# Patient Record
Sex: MALE | Race: BLACK OR AFRICAN AMERICAN | Employment: UNEMPLOYED
[De-identification: names, ages, dates, MRNs, and addresses within clinical notes are randomized per-mention and may not be internally consistent; named-entity substitution may affect disease eponyms.]

---

## 2017-01-24 ENCOUNTER — INITIAL CONSULT (OUTPATIENT)
Dept: PAIN MANAGEMENT | Facility: CLINIC | Age: 42
End: 2017-01-24

## 2017-01-24 VITALS
HEART RATE: 93 BPM | OXYGEN SATURATION: 97 % | RESPIRATION RATE: 16 BRPM | DIASTOLIC BLOOD PRESSURE: 77 MMHG | BODY MASS INDEX: 28.79 KG/M2 | HEIGHT: 68 IN | SYSTOLIC BLOOD PRESSURE: 117 MMHG | WEIGHT: 190 LBS

## 2017-01-24 DIAGNOSIS — F19.11 H/O: SUBSTANCE ABUSE (HCC): ICD-10-CM

## 2017-01-24 DIAGNOSIS — F99 PSYCHIATRIC ILLNESS: ICD-10-CM

## 2017-01-24 DIAGNOSIS — M25.562 CHRONIC PAIN OF LEFT KNEE: Primary | ICD-10-CM

## 2017-01-24 DIAGNOSIS — G89.29 CHRONIC PAIN OF LEFT KNEE: Primary | ICD-10-CM

## 2017-01-24 PROCEDURE — 99244 OFF/OP CNSLTJ NEW/EST MOD 40: CPT | Performed by: ANESTHESIOLOGY

## 2017-01-24 RX ORDER — CETIRIZINE HYDROCHLORIDE 10 MG/1
TABLET ORAL
Refills: 0 | COMMUNITY
Start: 2016-11-07 | End: 2017-01-24 | Stop reason: ALTCHOICE

## 2017-01-24 RX ORDER — ZOLPIDEM TARTRATE 12.5 MG/1
12.5 TABLET, FILM COATED, EXTENDED RELEASE ORAL
Status: ON HOLD | COMMUNITY
End: 2021-11-19 | Stop reason: HOSPADM

## 2017-01-24 RX ORDER — NICOTINE 21 MG/24HR
PATCH, TRANSDERMAL 24 HOURS TRANSDERMAL
COMMUNITY
Start: 2016-10-06 | End: 2017-01-24 | Stop reason: ALTCHOICE

## 2017-01-24 RX ORDER — ASENAPINE 10 MG/1
10 TABLET SUBLINGUAL
Status: ON HOLD | COMMUNITY
End: 2021-11-19 | Stop reason: HOSPADM

## 2017-01-24 RX ORDER — SULFAMETHOXAZOLE AND TRIMETHOPRIM 800; 160 MG/1; MG/1
TABLET ORAL
COMMUNITY
Start: 2016-12-22 | End: 2017-01-24 | Stop reason: ALTCHOICE

## 2017-01-24 RX ORDER — PRAZOSIN HYDROCHLORIDE 1 MG/1
CAPSULE ORAL
Status: ON HOLD | COMMUNITY
Start: 2016-08-15 | End: 2021-11-19 | Stop reason: HOSPADM

## 2017-01-24 RX ORDER — BENZTROPINE MESYLATE 1 MG/1
1 TABLET ORAL
COMMUNITY
End: 2017-01-24 | Stop reason: ALTCHOICE

## 2017-01-24 RX ORDER — OXYCODONE AND ACETAMINOPHEN 7.5; 325 MG/1; MG/1
TABLET ORAL
Status: ON HOLD | COMMUNITY
Start: 2016-10-06 | End: 2021-11-19 | Stop reason: HOSPADM

## 2017-01-24 RX ORDER — CEPHALEXIN 500 MG/1
CAPSULE ORAL
Refills: 0 | COMMUNITY
Start: 2016-11-07 | End: 2017-01-24 | Stop reason: ALTCHOICE

## 2017-01-24 RX ORDER — BENZTROPINE MESYLATE 1 MG/1
1 TABLET ORAL DAILY
Status: ON HOLD | COMMUNITY
End: 2021-11-19 | Stop reason: HOSPADM

## 2017-01-24 RX ORDER — PROMETHAZINE HYDROCHLORIDE AND CODEINE PHOSPHATE 6.25; 1 MG/5ML; MG/5ML
SYRUP ORAL
COMMUNITY
Start: 2016-12-22 | End: 2017-01-24 | Stop reason: ALTCHOICE

## 2017-01-24 RX ORDER — NAPROXEN 500 MG/1
TABLET ORAL
COMMUNITY
Start: 2016-11-23 | End: 2017-01-24 | Stop reason: ALTCHOICE

## 2017-01-24 ASSESSMENT — ENCOUNTER SYMPTOMS
ALLERGIC/IMMUNOLOGIC NEGATIVE: 1
GASTROINTESTINAL NEGATIVE: 1
BACK PAIN: 1
RESPIRATORY NEGATIVE: 1
EYES NEGATIVE: 1

## 2021-11-13 ENCOUNTER — HOSPITAL ENCOUNTER (INPATIENT)
Age: 46
LOS: 7 days | Discharge: HOME | DRG: 751 | End: 2021-11-20
Attending: PSYCHIATRY & NEUROLOGY | Admitting: PSYCHIATRY & NEUROLOGY
Payer: MEDICARE

## 2021-11-13 PROBLEM — F32.A DEPRESSION WITH SUICIDAL IDEATION: Status: ACTIVE | Noted: 2021-11-13

## 2021-11-13 PROBLEM — R45.851 DEPRESSION WITH SUICIDAL IDEATION: Status: ACTIVE | Noted: 2021-11-13

## 2021-11-13 PROCEDURE — 1240000000 HC EMOTIONAL WELLNESS R&B

## 2021-11-13 PROCEDURE — 6370000000 HC RX 637 (ALT 250 FOR IP): Performed by: PSYCHIATRY & NEUROLOGY

## 2021-11-13 RX ORDER — HYDROXYZINE 50 MG/1
50 TABLET, FILM COATED ORAL 3 TIMES DAILY PRN
Status: DISCONTINUED | OUTPATIENT
Start: 2021-11-13 | End: 2021-11-20 | Stop reason: HOSPADM

## 2021-11-13 RX ORDER — NICOTINE 21 MG/24HR
1 PATCH, TRANSDERMAL 24 HOURS TRANSDERMAL DAILY
Status: DISCONTINUED | OUTPATIENT
Start: 2021-11-13 | End: 2021-11-20 | Stop reason: HOSPADM

## 2021-11-13 RX ORDER — TRAZODONE HYDROCHLORIDE 50 MG/1
50 TABLET ORAL NIGHTLY PRN
Status: DISCONTINUED | OUTPATIENT
Start: 2021-11-13 | End: 2021-11-20 | Stop reason: HOSPADM

## 2021-11-13 RX ORDER — ACETAMINOPHEN 325 MG/1
650 TABLET ORAL EVERY 4 HOURS PRN
Status: DISCONTINUED | OUTPATIENT
Start: 2021-11-13 | End: 2021-11-20 | Stop reason: HOSPADM

## 2021-11-13 RX ORDER — POLYETHYLENE GLYCOL 3350 17 G/17G
17 POWDER, FOR SOLUTION ORAL DAILY PRN
Status: DISCONTINUED | OUTPATIENT
Start: 2021-11-13 | End: 2021-11-20 | Stop reason: HOSPADM

## 2021-11-13 RX ORDER — MAGNESIUM HYDROXIDE/ALUMINUM HYDROXICE/SIMETHICONE 120; 1200; 1200 MG/30ML; MG/30ML; MG/30ML
30 SUSPENSION ORAL EVERY 6 HOURS PRN
Status: DISCONTINUED | OUTPATIENT
Start: 2021-11-13 | End: 2021-11-20 | Stop reason: HOSPADM

## 2021-11-13 RX ORDER — ZOLPIDEM TARTRATE 10 MG/1
10 TABLET ORAL NIGHTLY PRN
Status: DISCONTINUED | OUTPATIENT
Start: 2021-11-13 | End: 2021-11-14

## 2021-11-13 RX ORDER — IBUPROFEN 400 MG/1
400 TABLET ORAL EVERY 6 HOURS PRN
Status: DISCONTINUED | OUTPATIENT
Start: 2021-11-13 | End: 2021-11-20 | Stop reason: HOSPADM

## 2021-11-13 RX ADMIN — ACETAMINOPHEN 650 MG: 325 TABLET ORAL at 19:13

## 2021-11-13 RX ADMIN — HYDROXYZINE HYDROCHLORIDE 50 MG: 50 TABLET, FILM COATED ORAL at 23:27

## 2021-11-13 RX ADMIN — IBUPROFEN 400 MG: 400 TABLET ORAL at 19:58

## 2021-11-13 RX ADMIN — ACETAMINOPHEN 650 MG: 325 TABLET ORAL at 23:27

## 2021-11-13 RX ADMIN — TRAZODONE HYDROCHLORIDE 50 MG: 50 TABLET ORAL at 21:00

## 2021-11-13 RX ADMIN — ZOLPIDEM TARTRATE 10 MG: 10 TABLET ORAL at 22:56

## 2021-11-13 ASSESSMENT — PAIN SCALES - GENERAL
PAINLEVEL_OUTOF10: 10
PAINLEVEL_OUTOF10: 10
PAINLEVEL_OUTOF10: 5
PAINLEVEL_OUTOF10: 10

## 2021-11-13 ASSESSMENT — PAIN DESCRIPTION - ORIENTATION: ORIENTATION: RIGHT;LEFT

## 2021-11-13 ASSESSMENT — SLEEP AND FATIGUE QUESTIONNAIRES
SLEEP PATTERN: DIFFICULTY FALLING ASLEEP;DISTURBED/INTERRUPTED SLEEP;INSOMNIA
DIFFICULTY FALLING ASLEEP: YES
RESTFUL SLEEP: NO
DIFFICULTY ARISING: NO
AVERAGE NUMBER OF SLEEP HOURS: 2
DO YOU HAVE DIFFICULTY SLEEPING: YES
DO YOU USE A SLEEP AID: YES
DIFFICULTY STAYING ASLEEP: YES

## 2021-11-13 ASSESSMENT — PAIN DESCRIPTION - LOCATION: LOCATION: RIB CAGE

## 2021-11-13 ASSESSMENT — PAIN DESCRIPTION - FREQUENCY: FREQUENCY: CONTINUOUS

## 2021-11-13 ASSESSMENT — PAIN DESCRIPTION - PAIN TYPE: TYPE: ACUTE PAIN

## 2021-11-13 ASSESSMENT — PATIENT HEALTH QUESTIONNAIRE - PHQ9: SUM OF ALL RESPONSES TO PHQ QUESTIONS 1-9: 18

## 2021-11-13 ASSESSMENT — LIFESTYLE VARIABLES: HISTORY_ALCOHOL_USE: NO

## 2021-11-13 ASSESSMENT — PAIN DESCRIPTION - DESCRIPTORS: DESCRIPTORS: ACHING;CONSTANT
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
## 2021-11-13 NOTE — BH NOTE
Patient scanned with security wand per order for safety. Patient signed voluntary consent for treatment form only, did not sign any other documents at this time.

## 2021-11-14 PROBLEM — F12.10 CANNABIS USE DISORDER, MILD, ABUSE: Status: ACTIVE | Noted: 2021-11-14

## 2021-11-14 PROBLEM — F32.3 MAJOR DEPRESSIVE DISORDER, SINGLE EPISODE, SEVERE WITH PSYCHOTIC FEATURES (HCC): Status: ACTIVE | Noted: 2021-11-14

## 2021-11-14 PROBLEM — F23 ACUTE PSYCHOSIS (HCC): Status: ACTIVE | Noted: 2021-11-14

## 2021-11-14 PROBLEM — F33.3 MAJOR DEPRESSIVE DISORDER, RECURRENT, SEVERE WITH PSYCHOTIC FEATURES (HCC): Status: ACTIVE | Noted: 2021-11-14

## 2021-11-14 PROCEDURE — 1240000000 HC EMOTIONAL WELLNESS R&B

## 2021-11-14 PROCEDURE — APPSS180 APP SPLIT SHARED TIME > 60 MINUTES: Performed by: NURSE PRACTITIONER

## 2021-11-14 PROCEDURE — 6370000000 HC RX 637 (ALT 250 FOR IP): Performed by: PSYCHIATRY & NEUROLOGY

## 2021-11-14 PROCEDURE — 90792 PSYCH DIAG EVAL W/MED SRVCS: CPT | Performed by: PSYCHIATRY & NEUROLOGY

## 2021-11-14 PROCEDURE — 99223 1ST HOSP IP/OBS HIGH 75: CPT | Performed by: INTERNAL MEDICINE

## 2021-11-14 RX ORDER — QUETIAPINE FUMARATE 50 MG/1
50 TABLET, FILM COATED ORAL 2 TIMES DAILY
Status: DISCONTINUED | OUTPATIENT
Start: 2021-11-14 | End: 2021-11-15

## 2021-11-14 RX ADMIN — QUETIAPINE FUMARATE 50 MG: 50 TABLET ORAL at 10:57

## 2021-11-14 RX ADMIN — TRAZODONE HYDROCHLORIDE 50 MG: 50 TABLET ORAL at 20:36

## 2021-11-14 RX ADMIN — QUETIAPINE FUMARATE 50 MG: 50 TABLET ORAL at 20:36

## 2021-11-14 RX ADMIN — HYDROXYZINE HYDROCHLORIDE 50 MG: 50 TABLET, FILM COATED ORAL at 19:49

## 2021-11-14 RX ADMIN — HYDROXYZINE HYDROCHLORIDE 50 MG: 50 TABLET, FILM COATED ORAL at 14:15

## 2021-11-14 RX ADMIN — ACETAMINOPHEN 650 MG: 325 TABLET ORAL at 19:48

## 2021-11-14 ASSESSMENT — PAIN SCALES - GENERAL
PAINLEVEL_OUTOF10: 10
PAINLEVEL_OUTOF10: 8
PAINLEVEL_OUTOF10: 8

## 2021-11-14 ASSESSMENT — PAIN DESCRIPTION - PAIN TYPE
TYPE: CHRONIC PAIN

## 2021-11-14 ASSESSMENT — PAIN DESCRIPTION - ORIENTATION
ORIENTATION: LEFT

## 2021-11-14 ASSESSMENT — PAIN DESCRIPTION - DESCRIPTORS
DESCRIPTORS: THROBBING
DESCRIPTORS: NAGGING
DESCRIPTORS: NAGGING

## 2021-11-14 ASSESSMENT — PAIN DESCRIPTION - LOCATION
LOCATION: OTHER (COMMENT)
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
## 2021-11-14 NOTE — GROUP NOTE
Group Therapy Note    Date: 11/13/2021    Group Start Time: 2000  Group End Time: 2017  Group Topic: Wrap-Up    LEANDER Mitchell        Group Therapy Note    Attendees: 7/19           Status After Intervention:  Improved    Participation Level:  Active Listener    Participation Quality: Appropriate      Speech:  normal      Thought Process/Content: Logical      Affective Functioning: Congruent      Mood: elevated      Level of consciousness:  Alert      Response to Learning: Able to verbalize current knowledge/experience      Endings: None Reported    Modes of Intervention: Support and Socialization      Discipline Responsible: [employer]      Signature:  Hannah Lockhart

## 2021-11-14 NOTE — CARE COORDINATION
BHI Biopsychosocial Assessment    Current Level of Psychosocial Functioning     Independent xx  Dependent    Minimal Assist     Comments:    Psychosocial High Risk Factors (check all that apply)    Unable to obtain meds   Chronic illness/pain  xx  Substance abuse   Lack of Family Support   Financial stress xx  Isolation   Inadequate Community Resources  Suicide attempt(s)  Not taking medications  xx  Victim of crime   Developmental Delay  Unable to manage personal needs    Age 72 or older   Homeless  No transportation   Readmission within 30 days  Unemployment  Traumatic Event    Comments:   Psychiatric Advanced Directives: pt denies     Family to Involve in Treatment:     Sexual Orientation:  N/A    Patient Strengths: pt is linked with [facility], pt receives [employer]     Patient Barriers: pt has history of cocaine abuse reports he has been told he had a positive drug screen but disputes this as inaccurate, pt is unemployed      Opiate Education Provided:  Pt denies opiate abuse       CMHC/mental health history: Pt reports he is linked with [facility]. Plan of Care   medication management, group/individual therapies, family meetings, psycho -education, treatment team meetings to assist with stabilization    Initial Discharge Plan:  Pt to return home at discharge. Clinical Summary:  Chacha Cintron is a 51year old single male who has been admitted to [facility], pt reports he has had increase in \"confusion, and I had devices in the house going haywire,\" pt reports he believes his cell phone \"would do it's own thing,\" reports the televisions in his home were \"all messed up, going to a foreign language then back again. \" Pt reports his GF brought him to hospital to address his \"confusion\" and disorganized thought process.  Pt reports he collects social security income disability, states he \"occasionally helps out\" at the [facility], but states he believes that social security \"knows what I'm doing and they are ready to cut me off. \" Pt states he believes this because he has \"all kinds of social security numbers and birth dates. \" Pt states his GF is supportive. Pt denies AOD issues, states he discussed this with another provider today when the provider told him he had a positive drug screen, denies use of cocaine, denies need for resources. Pt denies any legal concerns, pt states he has some traffic fines to regain his driving privileges. Pt reports he has 5 children, pt states he has relationships with the children but hasn't seen his oldest son \"in a quite a while. \" SW offered ongoing support, encouragement.
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
## 2021-11-14 NOTE — GROUP NOTE
Group Therapy Note    Date: 11/14/2021    Group Start Time: 1000  Group End Time: 4017  Group Topic: Psychoeducation    [address], LSW    patient refused to attend psychoeducation group at 10a after encouragement from staff.   1:1 talk time provided as alternative to group session

## 2021-11-14 NOTE — BH NOTE
Patient approached the nursing station regarding his wallet he insisted that he see it and that it be put in the safe. He then went in his pocket and pulled out a orange lighter and stated \" They didn't check me so I still have this\" I instantly directed him to follow me to the triage room where I asked \"Do you have anything else on you \" He said \" No\" I than used the metal detector and wanded the surface of his entire body. Nothing more was found.

## 2021-11-14 NOTE — PLAN OF CARE
Problem: Altered Mood, Depressive Behavior:  Goal: Able to verbalize and/or display a decrease in depressive symptoms  Description: Able to verbalize and/or display a decrease in depressive symptoms  Outcome: Ongoing     Problem: Altered Mood, Depressive Behavior:  Goal: Ability to disclose and discuss suicidal ideas will improve  Description: Ability to disclose and discuss suicidal ideas will improve  11/14/2021 1507 by Faustina Hu  Outcome: Ongoing     Problem: Altered Mood, Depressive Behavior:  Goal: Absence of self-harm  Description: Absence of self-harm  Outcome: Ongoing
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
## 2021-11-14 NOTE — PROGRESS NOTES
Behavioral Services  Medicare Certification Upon Admission    I certify that this patient's inpatient psychiatric hospital admission is medically necessary for:    [x] (1) Treatment which could reasonably be expected to improve this patient's condition,       [x] (2) Or for diagnostic study;     AND     [x](2) The inpatient psychiatric services are provided while the individual is under the care of a physician and are included in the individualized plan of care.     Estimated length of stay/service 3-5 days    Plan for post-hospital care [facility]    Electronically signed by Edmond Bryan MD on 11/14/2021 at 9:52 AM
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
## 2021-11-14 NOTE — PLAN OF CARE
Problem: Altered Mood, Depressive Behavior:  Goal: Ability to disclose and discuss suicidal ideas will improve  Description: Ability to disclose and discuss suicidal ideas will improve  Outcome: Ongoing     Patient denied any thoughts or plans to commit suicide. He is being monitors per Q 15 Min. Problem: Pain:  Goal: Pain level will decrease  Description: Pain level will decrease  Outcome: Ongoing   Patient reported 10/10 on the pain scale. PRN medications were adminidtereassessed and reported pain  8/10.

## 2021-11-15 PROCEDURE — 6370000000 HC RX 637 (ALT 250 FOR IP): Performed by: PSYCHIATRY & NEUROLOGY

## 2021-11-15 PROCEDURE — APPSS30 APP SPLIT SHARED TIME 16-30 MINUTES: Performed by: NURSE PRACTITIONER

## 2021-11-15 PROCEDURE — 90833 PSYTX W PT W E/M 30 MIN: CPT | Performed by: PSYCHIATRY & NEUROLOGY

## 2021-11-15 PROCEDURE — 1240000000 HC EMOTIONAL WELLNESS R&B

## 2021-11-15 PROCEDURE — 99232 SBSQ HOSP IP/OBS MODERATE 35: CPT | Performed by: PSYCHIATRY & NEUROLOGY

## 2021-11-15 RX ORDER — HALOPERIDOL 5 MG
5 TABLET ORAL 2 TIMES DAILY
Status: DISCONTINUED | OUTPATIENT
Start: 2021-11-15 | End: 2021-11-15

## 2021-11-15 RX ORDER — HALOPERIDOL 1 MG/1
1 TABLET ORAL 3 TIMES DAILY
Status: DISCONTINUED | OUTPATIENT
Start: 2021-11-15 | End: 2021-11-17

## 2021-11-15 RX ORDER — HALOPERIDOL 1 MG/1
0.5 TABLET ORAL ONCE
Status: COMPLETED | OUTPATIENT
Start: 2021-11-15 | End: 2021-11-15

## 2021-11-15 RX ADMIN — HALOPERIDOL 0.5 MG: 1 TABLET ORAL at 14:42

## 2021-11-15 RX ADMIN — QUETIAPINE FUMARATE 50 MG: 50 TABLET ORAL at 07:55

## 2021-11-15 RX ADMIN — HALOPERIDOL 1 MG: 1 TABLET ORAL at 20:28

## 2021-11-15 RX ADMIN — HYDROXYZINE HYDROCHLORIDE 50 MG: 50 TABLET, FILM COATED ORAL at 07:56

## 2021-11-15 RX ADMIN — ACETAMINOPHEN 650 MG: 325 TABLET ORAL at 20:28

## 2021-11-15 RX ADMIN — TRAZODONE HYDROCHLORIDE 50 MG: 50 TABLET ORAL at 22:54

## 2021-11-15 RX ADMIN — IBUPROFEN 400 MG: 400 TABLET ORAL at 18:35

## 2021-11-15 RX ADMIN — IBUPROFEN 400 MG: 400 TABLET ORAL at 12:44

## 2021-11-15 ASSESSMENT — PAIN SCALES - GENERAL
PAINLEVEL_OUTOF10: 4
PAINLEVEL_OUTOF10: 3
PAINLEVEL_OUTOF10: 2
PAINLEVEL_OUTOF10: 7

## 2021-11-15 NOTE — GROUP NOTE
Group Therapy Note    Date: 11/15/2021    Group Start Time: 1000  Group End Time: 6508  Group Topic: Psychoeducation    SHANTI Perera LSW        Group Therapy Note    patient refused to attend Psychoeducational group at 10:00 AM after encouragement from staff.               Signature:  SHANTI Crouch LSW

## 2021-11-15 NOTE — PROGRESS NOTES
Daily Progress Note  11/15/2021    Patient Name: Wendy Corbin:   Suicidal ideation with a plan to overdose, paranoia, delusional thoughts         PRNs: No emergency medications administered since admission     Scheduled medications: Adherent     SW discharge plan:  Lives in a home with three children and a long-term girlfriend    SUBJECTIVE:     Patient seen for follow-up assessment. He is anxious and paranoid. Demanding to be discharged. States that his 72-hour hold is over. Wants to see his \"pink slip\". Patient endorsing belief that all of his paperwork is really for someone named Paulina Castillo and states that staff are giving him the medication for Paulina Castillo. Explained how our process works to ensure that medications are only given to the appropriate patient and Mr. Melva Moore becomes more anxious. He states that he hates computers and is focused on viruses and Malware. Patient is restless, fidgeting, and leg is bouncing. Patient is concerned that he is not receiving the appropriate medications. Did request for patient to sign FELISA to be able to speak with his provider at [facility] to gain collateral information, but patient denied. Patient is guarded throughout our discussion. Staff report that patient had been endorsing auditory hallucinations which were telling him he was not safe and seeing visual hallucinations of  shadows. Reviewed past medical records more thoroughly. Per documentation from 2009, patient has a past diagnosis of schizoaffective disorder bipolar type. He has trialed many medications including Geodon, Zyprexa, risperidone, and Tegretol, all with reported \"tongue swelling\" side effects. Unable to find any past history of utilizing Haldol or any side effects to that medication. Will discontinue Seroquel and start Haldol with plan to transition to long-acting injectable with demonstrated tolerability.     Patient does express concern that he will miss an appointment that was scheduled many months ago with comprehensive pain clinic. He does not want to miss this appointment on 11/19. Appetite:  [] Normal/Adequate/Unchanged  [] Increased  [x] Decreased      Sleep:       [] Normal/Adequate/Unchanged  [] Fair  [x] Poor      Group Attendance on Unit:   [] Yes  [x] Selectively    [] No    Medication Side Effects: Dry mouth  Patient denies drinking. States it makes him have to go to the bathroom. Encouraged patient to consume more fluids. Mental Status Exam  Level of consciousness: Alert and awake   Appearance: Appropriate attire for setting, seated in chair, with poor grooming and hygiene   Behavior/Motor: Restless, psychomotor agitation  Attitude toward examiner: Paranoid, guarded, intense eye contact, suspicious  Speech: Tremulous at times, breathy, rapid, well articulated  Mood: \"Not good\"  Affect: Anxious, reactive  Thought processes: linear and thought blocking   Thought content: Denies homicidal ideation  Suicidal Ideation: Provided no response  Delusions: Paranoid and persecutory  Perceptual Disturbance: Endorsing both auditory and visual  Cognition: Oriented to self,, location, and time, and not situation memory: intact  Insight & Judgement: poor     Data   height is 5' 8\" (1.727 m) and weight is 198 lb (89.8 kg). His temporal temperature is 96.9 °F (36.1 °C). His blood pressure is 130/90 (abnormal) and his pulse is 92. His respiration is 14. Labs:   No visits with results within 2 Day(s) from this visit.    Latest known visit with results is:   Admission on 04/22/2016, Discharged on 04/22/2016   Component Date Value Ref Range Status    Acetaminophen Level 04/22/2016 <10* 10 - 30 ug/mL Final    Comment: Performed at [facility] Emergency Dept and [facility], [address] [place] [address], [address] [place] [address]      Glucose 04/22/2016 95  70 - 99 mg/dL Final    BUN 04/22/2016 13  6 - 20 mg/dL Final    CREATININE 04/22/2016 1.10  0.70 - 1.20 mg/dL Final    Bun/Cre Ratio 04/22/2016 NOT REPORTED  9 - 20 Final    Calcium 04/22/2016 9.4  8.6 - 10.4 mg/dL Final    Sodium 04/22/2016 139  135 - 144 mmol/L Final    Potassium 04/22/2016 3.8  3.7 - 5.3 mmol/L Final    Chloride 04/22/2016 100  98 - 107 mmol/L Final    CO2 04/22/2016 24  20 - 31 mmol/L Final    Anion Gap 04/22/2016 15  9 - 17 mmol/L Final    GFR Non- 04/22/2016 >60  >60 mL/min Final    GFR  04/22/2016 >60  >60 mL/min Final    GFR Comment 04/22/2016        Final    Comment: Average GFR for 3649 years old:   80 mL/min/1.73sq m  Chronic Kidney Disease:   <60 mL/min/1.73sq m  Kidney failure:   <15 mL/min/1.73sq m        Performed at [facility] Emergency Dept and [address] [facility], [facility], [address] [place] [address]      GFR Staging 04/22/2016 NOT REPORTED   Final    WBC 04/22/2016 12.9* 3.5 - 11.0 k/uL Final    RBC 04/22/2016 5.23  4.5 - 5.9 m/uL Final    Hemoglobin 04/22/2016 14.0  13.5 - 17.5 g/dL Final    Hematocrit 04/22/2016 44.1  41 - 53 % Final    MCV 04/22/2016 84.3  80 - 100 fL Final    MCH 04/22/2016 26.7  26 - 34 pg Final    MCHC 04/22/2016 31.7  31 - 37 g/dL Final    RDW 04/22/2016 14.1  12.5 - 15.4 % Final    Platelets 71/14/3127 182  140 - 450 k/uL Final    MPV 04/22/2016 9.9  6.0 - 12.0 fL Final    Differential Type 04/22/2016 NOT REPORTED   Final    WBC Morphology 04/22/2016 NOT REPORTED   Final    RBC Morphology 04/22/2016 NOT REPORTED   Final    Platelet Estimate 66/50/0626 NOT REPORTED   Final    Seg Neutrophils 04/22/2016 71* 36 - 66 % Final    Lymphocytes 04/22/2016 20* 24 - 44 % Final    Monocytes 04/22/2016 8  2 - 11 % Final    Eosinophils % 04/22/2016 0* 1 - 4 % Final    Basophils 04/22/2016 1  0 - 2 % Final    Segs Absolute 04/22/2016 9.20* 1.8 - 7.7 k/uL Final    Absolute Lymph # 04/22/2016 2.60  1.0 - 4.8 k/uL Final    Absolute Mono # 04/22/2016 1.10  0.1 - 1.2 k/uL Final    Absolute Eos # 04/22/2016 0.00  0.0 - 0.4 k/uL Final    Basophils Absolute 04/22/2016 0.10  0.0 - 0.2 k/uL Final    Comment: Performed at [facility] Emergency Dept and [address] [place] [address], [address] [place] [address]      Ethanol 04/22/2016 <10  <10 mg/dL Final    Ethanol percent 04/22/2016 <0.010  % Final    Comment: Performed at [facility] Emergency Dept and [address] [place] [address], [address] [place] [address]      Albumin 04/22/2016 4.5  3.5 - 5.2 g/dL Final    Alkaline Phosphatase 04/22/2016 53  40 - 129 U/L Final    ALT 04/22/2016 28  5 - 41 U/L Final    AST 04/22/2016 40* <40 U/L Final    Total Bilirubin 04/22/2016 0.60  0.3 - 1.2 mg/dL Final    Bilirubin, Direct 04/22/2016 0.15  <0.31 mg/dL Final    Bilirubin, Indirect 04/22/2016 0.45  0.00 - 1.00 mg/dL Final    Total Protein 04/22/2016 7.8  6.4 - 8.3 g/dL Final    Globulin 04/22/2016 NOT REPORTED  1.5 - 3.8 g/dL Final    Albumin/Globulin Ratio 04/22/2016 1.4  1.0 - 2.5 Final    Comment: Performed at [facility] Emergency Dept and [address] [place] [address], [address] [place] [address]      Amphetamine Screen, Ur 04/22/2016 NEGATIVE  NEG Final    Comment:       (Positive cutoff 1000 ng/mL)                  Barbiturate Screen, Ur 04/22/2016 NEGATIVE  NEG Final    Comment:       (Positive cutoff 200 ng/mL)                  Benzodiazepine Screen, Urine 04/22/2016 POSITIVE* NEG Final    Comment:       (Positive cutoff 200 ng/mL)                  Cocaine Metabolite, Urine 04/22/2016 POSITIVE* NEG Final    Comment:       (Positive cutoff 300 ng/mL)                  Methadone Screen, Urine 04/22/2016 NEGATIVE  NEG Final    Comment:       (Positive cutoff 300 ng/mL)                  Opiates, Urine 04/22/2016 NEGATIVE  NEG Final    Comment:       (Positive cutoff 300 ng/mL)                  Phencyclidine, Urine 04/22/2016 NEGATIVE  NEG Final    Comment:       (Positive cutoff 25 ng/mL)                  Propoxyphene, Urine 04/22/2016 NOT REPORTED  NEG Final    Cannabinoid Scrn, Ur 04/22/2016 POSITIVE* NEG Final    Comment:       (Positive cutoff 50 ng/mL)                  Oxycodone Screen, Ur 04/22/2016 NEGATIVE  NEG Final    Comment:       (Positive cutoff 100 ng/mL)                  Methamphetamine, Urine 04/22/2016 NOT REPORTED  NEG Final    Tricyclic Antidepressants, Urine 04/22/2016 NOT REPORTED  NEG Final    MDMA, Urine 04/22/2016 NOT REPORTED  NEG Final    Buprenorphine Urine 04/22/2016 NOT REPORTED  NEG Final    Test Information 04/22/2016 Assay provides medical screening only. The absence of expected drug(s) and/or   Final    Comment:  metabolite(s) may indicate diluted or adulterated urine, limitations of testing   or timing of collection. Testing for legal purposes should be confirmed by another method. To request   confirmation of test result, please call the lab within 7 days of sample   submission. Performed at [facility] Emergency Dept and [address] [place] [address]      Salicylate Lvl 89/10/4910 <1* 3 - 10 mg/dL Final    Comment: Performed at [facility] Emergency Dept and [address] [place] [address]      Ventricular Rate 04/22/2016 87  BPM Final    Atrial Rate 04/22/2016 87  BPM Final    P-R Interval 04/22/2016 124  ms Final    QRS Duration 04/22/2016 88  ms Final    Q-T Interval 04/22/2016 370  ms Final    QTc Calculation (Bazett) 04/22/2016 445  ms Final    P Axis 04/22/2016 52  degrees Final    R Axis 04/22/2016 22  degrees Final    T Axis 04/22/2016 25  degrees Final         Reviewed patient's current plan of care and vital signs with nursing staff.     Labs reviewed: [x] Yes  Last EKG in EMR reviewed: [x] Yes    Medications  Current Facility-Administered Medications: haloperidol (HALDOL) tablet 5 mg, 5 mg, Oral, BID  acetaminophen (TYLENOL) tablet 650 mg, 650 mg, Oral, Q4H PRN  aluminum & magnesium hydroxide-simethicone (MAALOX) 200-200-20 MG/5ML suspension 30 mL, 30 mL, Oral, Q6H PRN  hydrOXYzine (ATARAX) tablet 50 mg, 50 mg, Oral, TID PRN  ibuprofen (ADVIL;MOTRIN) tablet 400 mg, 400 mg, Oral, Q6H PRN  polyethylene glycol (GLYCOLAX) packet 17 g, 17 g, Oral, Daily PRN  traZODone (DESYREL) tablet 50 mg, 50 mg, Oral, Nightly PRN  nicotine (NICODERM CQ) 14 MG/24HR 1 patch, 1 patch, TransDERmal, Daily  influenza quadrivalent split vaccine (FLUZONE;FLUARIX;FLULAVAL;AFLURIA) injection 0.5 mL, 0.5 mL, IntraMUSCular, Prior to discharge    ASSESSMENT  Major depressive disorder, recurrent, severe with psychotic features ([facility] [address]    Rule out schizoaffective disorder         PLAN  Patient symptoms are: Remains Unstable  Discontinue Seroquel  Start Haldol 5 mg twice daily with plan to transition to long-acting injectable once tolerability established  Monitor need and frequency of PRN medications  Encourage participation in groups and milieu  Attempt to develop insight  Psycho-education conducted  Supportive Therapy conducted  Probable discharge: Per attending MD  Follow-up daily while inpatient    Patient continues to be monitored in the inpatient psychiatric facility at [facility] for safety and stabilization. Patient continues to need, on a daily basis, active treatment furnished directly by or requiring the supervision of inpatient psychiatric personnel. Electronically signed by MAGY Baum CNP on 11/15/2021 at 2:04 PM    **This report has been created using voice recognition software. It may contain minor errors which are inherent in voice recognition technology. **    I independently saw and evaluated the patient. I reviewed the nurse practitioners documentation above. Any additional comments or changes to the nurse practitioners documentation are stated below otherwise agree with assessment. Plan will be as follows:  Spent 30 minutes with the patient, of that greater than 16 minutes spent in supportive psychotherapy.   Patient actively hearing auditory hallucinations telling

## 2021-11-15 NOTE — GROUP NOTE
Group Therapy Note    Date: 11/15/2021    Group Start Time: 1100  Group End Time: 3578  Group Topic: Psychoeducation    LEANDER Richardson      Patient declined to attend self-expression group at 1100 despite encouragement from staff. 1:1 talk time offered by staff as alternative to group session.       Signature:  Daisy Shore

## 2021-11-15 NOTE — PLAN OF CARE
Problem: Altered Mood, Depressive Behavior:  Goal: Able to verbalize and/or display a decrease in depressive symptoms  Description: Able to verbalize and/or display a decrease in depressive symptoms  11/14/2021 2249 by Shy Yip LPN  Outcome: Ongoing     Patient showed a slight decrease in his depressive state after being redirected, educated on his medication, given his prn medications and attending group therapy. Prior to he complained of being depressed 10/10 on a scale of 10/10 after therapeutic medication regimen was he administered he reported a 8/10 on a 0/10 scale. Problem: Altered Mood, Depressive Behavior:  Goal: Absence of self-harm  Description: Absence of self-harm  11/14/2021 2249 by Shy Yip LPN  Outcome: Ongoing    Although patient has reported hearing voice telling him \" he is not safe\" and seeing shadows. He continues to remain absent of self harm and is currently showing no sign nor symptoms of harming himself. He is being monitored every 15 min for any signs and symptoms of change.

## 2021-11-15 NOTE — GROUP NOTE
Group Therapy Note    Date: 11/15/2021    Group Start Time: 1330  Group End Time: 1400  Group Topic: Psychoeducation    LEANDER PINEDA    Autumn Richardson        Group Therapy Note    Attendees: 12/22         Patient's Goal:  To improve patient knowledge of healthy coping skills     Notes:  Patient was pleasant and appropriate throughout the session     Status After Intervention:  Improved    Participation Level:  Active Listener and Interactive    Participation Quality: Appropriate, Attentive, Sharing and Supportive      Speech:  normal      Thought Process/Content: Logical      Affective Functioning: Congruent      Mood: euthymic      Level of consciousness:  Alert, Oriented x4 and Attentive      Response to Learning: Able to verbalize current knowledge/experience, Able to verbalize/acknowledge new learning, Capable of insight and Progressing to goal      Endings: None Reported    Modes of Intervention: Education, Support, Socialization, Exploration, Clarifying and Problem-solving      Discipline Responsible: Psychoeducational Specialist      Signature:  Sincere Hernandez

## 2021-11-15 NOTE — GROUP NOTE
Group Therapy Note    Date: 11/14/2021    Group Start Time: 2005  Group End Time: 2024  Group Topic: Wrap-Up    LEANDER Mitchell        Group Therapy Note    Attendees: 11/23             Status After Intervention:  Improved    Participation Level:  Active Listener    Participation Quality: Appropriate      Speech:  normal      Thought Process/Content: Logical      Affective Functioning: Congruent      Mood: elevated      Level of consciousness:  Alert      Response to Learning: Able to verbalize current knowledge/experience      Endings: None Reported    Modes of Intervention: Support and Socialization      Discipline Responsible: Behavorial Health Tech      Signature:  Hannah Lockhart
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
## 2021-11-15 NOTE — PLAN OF CARE
Problem: Altered Mood, Depressive Behavior:  Goal: Able to verbalize and/or display a decrease in depressive symptoms  Description: Able to verbalize and/or display a decrease in depressive symptoms  11/15/2021 1814 by Yumiko Benitez RN  Outcome: Ongoing     Problem: Altered Mood, Depressive Behavior:  Goal: Ability to disclose and discuss suicidal ideas will improve  Description: Ability to disclose and discuss suicidal ideas will improve  11/15/2021 1814 by Yumiko Benitez RN  Outcome: Ongoing   Patient was accepting of shift assessment. Patient stated that he slept poorly last night. Patient has appetite and continues to eat majority of meals. Patient denies suicidal and homicidal ideation through this shift. Patient was accepting of shift assessment. Patient stated that he slept well last night. Patient has appetite and continues to eat majority of meals. Patient denies suicidal and homicidal ideation through this shift.

## 2021-11-16 PROCEDURE — 6370000000 HC RX 637 (ALT 250 FOR IP): Performed by: PSYCHIATRY & NEUROLOGY

## 2021-11-16 PROCEDURE — 1240000000 HC EMOTIONAL WELLNESS R&B

## 2021-11-16 PROCEDURE — 99232 SBSQ HOSP IP/OBS MODERATE 35: CPT | Performed by: PSYCHIATRY & NEUROLOGY

## 2021-11-16 PROCEDURE — 90833 PSYTX W PT W E/M 30 MIN: CPT | Performed by: PSYCHIATRY & NEUROLOGY

## 2021-11-16 PROCEDURE — APPSS30 APP SPLIT SHARED TIME 16-30 MINUTES: Performed by: NURSE PRACTITIONER

## 2021-11-16 RX ORDER — LORAZEPAM 2 MG/ML
2 INJECTION INTRAMUSCULAR EVERY 4 HOURS PRN
Status: DISCONTINUED | OUTPATIENT
Start: 2021-11-16 | End: 2021-11-20 | Stop reason: HOSPADM

## 2021-11-16 RX ORDER — LORAZEPAM 1 MG/1
2 TABLET ORAL EVERY 4 HOURS PRN
Status: DISCONTINUED | OUTPATIENT
Start: 2021-11-16 | End: 2021-11-20 | Stop reason: HOSPADM

## 2021-11-16 RX ORDER — HALOPERIDOL 5 MG
5 TABLET ORAL EVERY 4 HOURS PRN
Status: DISCONTINUED | OUTPATIENT
Start: 2021-11-16 | End: 2021-11-20 | Stop reason: HOSPADM

## 2021-11-16 RX ORDER — DIPHENHYDRAMINE HYDROCHLORIDE 50 MG/ML
50 INJECTION INTRAMUSCULAR; INTRAVENOUS EVERY 4 HOURS PRN
Status: DISCONTINUED | OUTPATIENT
Start: 2021-11-16 | End: 2021-11-20 | Stop reason: HOSPADM

## 2021-11-16 RX ORDER — HALOPERIDOL 5 MG/ML
5 INJECTION INTRAMUSCULAR EVERY 4 HOURS PRN
Status: DISCONTINUED | OUTPATIENT
Start: 2021-11-16 | End: 2021-11-20 | Stop reason: HOSPADM

## 2021-11-16 RX ADMIN — HYDROXYZINE HYDROCHLORIDE 50 MG: 50 TABLET, FILM COATED ORAL at 10:28

## 2021-11-16 RX ADMIN — HALOPERIDOL 1 MG: 1 TABLET ORAL at 14:28

## 2021-11-16 RX ADMIN — LORAZEPAM 2 MG: 1 TABLET ORAL at 16:16

## 2021-11-16 RX ADMIN — HALOPERIDOL 5 MG: 5 TABLET ORAL at 16:16

## 2021-11-16 RX ADMIN — HALOPERIDOL 1 MG: 1 TABLET ORAL at 08:46

## 2021-11-16 NOTE — BH NOTE
Pt showing increased signs of paranoia and agitation. Pt was asked to give verbal or written consent to talk about his care with his sister Nadia Ambriz. Pt became considered that \"she might not be who she says she is\". After much encouragement pt was satisfied and comfortable to give verbal consent to talk to his sister. Pt sister stated that he is a daily drinker consuming nearly a case of beer or more daily. She also reported that he sometimes use drugs, but was unsure if he was still sober or not. She states that this is his 2nd admission in 3 months, and thinks he is more paranoid and suspicious than normal. Will relay information to doctor about daily alcohol use.

## 2021-11-16 NOTE — GROUP NOTE
Group Therapy Note    Date: 11/16/2021    Group Start Time: 1100  Group End Time: 1150  Group Topic: Recreational    [address], [facility]        Group Therapy Note    Attendees: 8/20    patient refused to attend communication skills group at [phone] after encouragement from staff. 1:1 talk time provided as alternative to group session.

## 2021-11-16 NOTE — PLAN OF CARE
Problem: Altered Mood, Depressive Behavior:  Goal: Absence of self-harm    Problem: Altered Mood, Depressive Behavior:  Goal: Ability to disclose and discuss suicidal ideas will improve  Description: Ability to disclose and discuss suicidal ideas will improve        Pt denies suicidal ideations at this time. Pt agreed to seek staff at anytime he felt like any urges to harm self would arise. Safety checks maintained [id number]. Pt remains free from self harm this shift. Pt denies wanting to cause harm to self or others at this time. Pt encouraged to seek nursing staff at anytime if he felt at danger to themselves or others. Pt states understanding. Safety checks maintained [id number]    Patient is complaining of anxiety at this time. Stating that they feel restless and are having trouble sleeping and calming down in order to rest this evening. Medication was given as prescribed for increased anxiety see MAR. Pt denies SI/HI. Pt reports anxiety. Pt appears very anxious and paranoid .  Accepting of medications

## 2021-11-16 NOTE — PLAN OF CARE
Problem: Altered Mood, Depressive Behavior:  Goal: Able to verbalize and/or display a decrease in depressive symptoms  Description: Able to verbalize and/or display a decrease in depressive symptoms  Outcome: Ongoing     Problem: Altered Mood, Depressive Behavior:  Goal: Ability to disclose and discuss suicidal ideas will improve  Description: Ability to disclose and discuss suicidal ideas will improve  Outcome: Ongoing     Problem: Altered Mood, Depressive Behavior:  Goal: Absence of self-harm  Description: Absence of self-harm  Outcome: Ongoing   Pt continues to be extremely paranoid delusional having flight of ideas focussed on discharge out isolative anxious.

## 2021-11-16 NOTE — BH NOTE
Nurse discussed pt alcohol use. Pt reports only drinking occasionally and only one or two drinks. Pt reports that he does not drink daily or feel like he is having any sort of withdrawal. Pt displays signs of increased anxiety and paranoia, requires encouragements and reassurance.

## 2021-11-16 NOTE — GROUP NOTE
Group Therapy Note    Date: 11/16/2021    Group Start Time: 1330  Group End Time: 5199  Group Topic: Cognitive Skills    LE[address]ER [address], [facility]        Group Therapy Note    Attendees: 9/19    patient refused to attend aromatherapy group at [phone] after encouragement from staff. 1:1 talk time provided as alternative to group session.

## 2021-11-16 NOTE — CARE COORDINATION
Viridiana advised by unit staff that pt daughter Court Novoa [phone] has called unit asking for update on patient, staff states expressing concern for pt wellness, no information was given as FELISA is not on record. VIRIDIANA spoke with pt this date, attempted to convey daughter's concern/call, pt became agitated, angry aeb rapid and pressured speech, pacing and stating he believes \"this is what y'all are trying to do,\" states he wants to talk to his ,will not continue conversation.

## 2021-11-16 NOTE — GROUP NOTE
Group Therapy Note    Date: 11/16/2021    Group Start Time: 1000  Group End Time: 1197  Group Topic: Psychotherapy    [place] JOSÉ MANUEL Greene; JOSÉ MANUEL Miles        Group Therapy Note    Attendees: 7/20         patient refused to attend psychotherapy group at [facility] after encouragement from staff.   1:1 talk time provided as alternative to group session    Signature:  JOSÉ MANUEL Miles

## 2021-11-17 PROCEDURE — 6370000000 HC RX 637 (ALT 250 FOR IP): Performed by: PSYCHIATRY & NEUROLOGY

## 2021-11-17 PROCEDURE — APPSS30 APP SPLIT SHARED TIME 16-30 MINUTES

## 2021-11-17 PROCEDURE — 99232 SBSQ HOSP IP/OBS MODERATE 35: CPT | Performed by: PSYCHIATRY & NEUROLOGY

## 2021-11-17 PROCEDURE — 1240000000 HC EMOTIONAL WELLNESS R&B

## 2021-11-17 RX ORDER — HALOPERIDOL 1 MG/1
2 TABLET ORAL 3 TIMES DAILY
Status: DISCONTINUED | OUTPATIENT
Start: 2021-11-17 | End: 2021-11-20 | Stop reason: HOSPADM

## 2021-11-17 RX ADMIN — TRAZODONE HYDROCHLORIDE 50 MG: 50 TABLET ORAL at 22:35

## 2021-11-17 RX ADMIN — HALOPERIDOL 2 MG: 1 TABLET ORAL at 22:35

## 2021-11-17 RX ADMIN — HALOPERIDOL 1 MG: 1 TABLET ORAL at 07:50

## 2021-11-17 RX ADMIN — HYDROXYZINE HYDROCHLORIDE 50 MG: 50 TABLET, FILM COATED ORAL at 17:31

## 2021-11-17 RX ADMIN — HALOPERIDOL 2 MG: 1 TABLET ORAL at 12:44

## 2021-11-17 RX ADMIN — HYDROXYZINE HYDROCHLORIDE 50 MG: 50 TABLET, FILM COATED ORAL at 12:44

## 2021-11-17 ASSESSMENT — PAIN SCALES - GENERAL: PAINLEVEL_OUTOF10: 0

## 2021-11-17 NOTE — GROUP NOTE
Group Therapy Note    Date: 11/17/2021    Group Start Time: 0900  Group End Time: 0930  Group Topic: Community Meeting    BJ Johnson    Group Therapy Note    Attendees: 5    Patient's Goal: Pt will identify daily goal and demonstrate understanding of unit guidelines and schedule    Notes:  Pt attended group and participated    Status After Intervention:  Improved    Participation Level:  Active Listener and Interactive    Participation Quality: Appropriate, Attentive, Sharing and Supportive      Speech:  normal      Thought Process/Content: Logical  Linear      Affective Functioning: Blunted      Mood: euthymic      Level of consciousness:  Alert, Oriented x4 and Attentive      Response to Learning: Able to verbalize current knowledge/experience, Able to verbalize/acknowledge new learning, Able to retain information, Capable of insight, Able to change behavior and Progressing to goal      Endings: None Reported    Modes of Intervention: Education, Support, Socialization and Exploration      Discipline Responsible: Behavorial Health Tech      Signature:  Ollie Jaramillo, [address]

## 2021-11-17 NOTE — PROGRESS NOTES
Pharmacy Med Education Group Note    Date: 11/17/21  Start Time: 4157  End Time: 1600    Number Participants in Group:  6    Goal:  Patient will demonstrate an understanding of the medications intended purpose and possible adverse effects  Topic: [place] for Pharmacy Med Ed Group    Discipline Responsible:     OT  AT  [facility].  RT     X Other       Participation Level:     None  Minimal      X Active Listener    X Interactive    Monopolizing         Participation Quality:    X Appropriate  Inappropriate     X       Attentive        Intrusive          Sharing        Resistant          Supportive        Lethargic       Affective:     X Congruent  Incongruent  Blunted  Flat    Constricted  Anxious  Elated  Angry    Labile  Depressed  Other         Cognitive:    X Alert  Oriented PPTP     Concentration   X G  F  P   Attention Span   X G  F  P   Short-Term Memory   X G  F  P   Long-Term Memory  G  F  P   ProblemSolving/  Decision Making  G  F  P   Ability to Process  Information   X G  F  P      Contributing Factors             Delusional             Hallucinating             Flight of Ideas             Other:       Modes of Intervention:    X Education   X Support  Exploration    Clarifying  Problem Solving  Confrontation    Socialization  Limit Setting  Reality Testing    Activity  Movement  Media    Other:            Response to Learning:    X Able to verbalize current knowledge/experience    Able to verbalize/acknowledge new learning    Able to retain information    Capable of insight    Able to change behavior    Progressing to goal    Other:        Comments:     Rissa Montes RPH,PharmD,  11/17/2021, 4:31 PM

## 2021-11-17 NOTE — GROUP NOTE
Group Therapy Note    Date: 11/17/2021    Group Start Time: 1100  Group End Time: 6248  Group Topic: Recreational    [address], [facility]        Group Therapy Note    Attendees: 9/18         Patient's Goal:  Identifying positive coping skills through recreation and leisure activities      Status After Intervention:  Improved    Participation Level:  Active Listener and Interactive    Participation Quality: Appropriate, Attentive, Sharing and Supportive      Speech:  normal      Thought Process/Content: Logical      Affective Functioning: Congruent      Mood: euphoric      Level of consciousness:  Alert, Oriented x4 and Attentive      Response to Learning: Able to verbalize current knowledge/experience, Able to verbalize/acknowledge new learning, Able to retain information and Capable of insight      Modes of Intervention: Education, Support, Socialization, Exploration, Clarifying, Problem-solving and Activity      Discipline Responsible: Psychoeducational Specialist      Signature:  Renu Patel

## 2021-11-17 NOTE — PROGRESS NOTES
Daily Progress Note  11/17/2021    Patient Name: Pool Gram: Suicidal ideation with a plan to overdose, paranoia, delusional thoughts         SUBJECTIVE:      Patient is seen today for a follow up assessment. Patient is compliant with scheduled Haldol. Patient received emergency oral Haldol and Ativan yesterday 11/16 at 1616. Per nursing documentation patient was pacing the unit with rapid pressured speech and making persecutory statements. Patient appears much more behaviorally in control today. He is agreeable to interview in his room. He states that his depression has significantly improved. He reports that his anxiety is a 7 (0-10 scale 0 being none and 10 being the worst). He reports \"my mind feels wired. \"  He reports that he slept fair last night but still continues to wake up sometimes with anxiety. He reports that his appetite has also significantly improved since being here. Patient reports fleeting suicidal ideation without current plan or intent. He denies homicidal ideation. He is able to contract for safety on this unit with this writer but still feels as if he would be unsafe at home. He states \"I just want to feel better enough to be with my children. \"  Patient continues to report feelings of hopelessness and helplessness but states that this is also starting to improve. He reports improvement in auditory hallucinations. He endorses visual hallucinations of shadows. He reports he still feels somewhat paranoid but reports that this is improved as well. He denies any side effects or medical complaints at this time. Patient has attended some groups today. At this time, the patient is not appropriate for a lower level of care. There is risk of decompensation and patient warrants further hospitalization for safety and stabilization.     Appetite:  [x] Normal/Adequate/Unchanged  [] Increased  [] Decreased      Sleep:       [] Normal/Adequate/Unchanged  [x] Fair  [] Poor      Group Attendance on Unit:   [] Yes  [x] Selectively    [] No    Medication Side Effects: Patient denies any medication side effects at the time of assessment. Mental Status Exam  Level of consciousness: Alert and awake. Appearance: Appropriate attire for setting, seated on bed, with fair  grooming and hygiene. Behavior/Motor: Approachable, no psychomotor abnormalities. Attitude toward examiner: Cooperative, attentive, fair eye contact. Speech: Normal rate, normal volume, normal tone. Mood:  Patient reports \"better\". Affect: Depressed, anxious  Thought processes: Linear and coherent. Thought content: Denies homicidal ideation. Suicidal Ideation: Fleeting suicidal ideations, without current plan or intent, contracts for safety on the unit. Delusions: No evidence of delusions. Reports improvement in paranoia. Perceptual Disturbance: Patient does not appear to be responding to internal stimuli. Reports improvement in auditory hallucinations. Endorses visual hallucinations. Cognition: Oriented to self, location, time, and situation. Memory: Intact. Insight & Judgement: Poor. Data   height is 5' 8\" (1.727 m) and weight is 198 lb (89.8 kg). His temporal temperature is 97.3 °F (36.3 °C). His blood pressure is 130/87 and his pulse is 99. His respiration is 14. Labs:   No visits with results within 2 Day(s) from this visit.    Latest known visit with results is:   Admission on 04/22/2016, Discharged on 04/22/2016   Component Date Value Ref Range Status    Acetaminophen Level 04/22/2016 <10* 10 - 30 ug/mL Final    Comment: Performed at [facility] and [facility], [address] [place] [address], [address] [place] [address]      Glucose 04/22/2016 95  70 - 99 mg/dL Final    BUN 04/22/2016 13  6 - 20 mg/dL Final    CREATININE 04/22/2016 1.10  0.70 - 1.20 mg/dL Final    Bun/Cre Ratio 04/22/2016 NOT REPORTED  9 - 20 Final    Calcium 04/22/2016 9.4  8.6 - 10.4 mg/dL Final  Sodium 04/22/2016 139  135 - 144 mmol/L Final    Potassium 04/22/2016 3.8  3.7 - 5.3 mmol/L Final    Chloride 04/22/2016 100  98 - 107 mmol/L Final    CO2 04/22/2016 24  20 - 31 mmol/L Final    Anion Gap 04/22/2016 15  9 - 17 mmol/L Final    GFR Non- 04/22/2016 >60  >60 mL/min Final    GFR  04/22/2016 >60  >60 mL/min Final    GFR Comment 04/22/2016        Final    Comment: Average GFR for [age] years old:   80 mL/min/1.73sq m  Chronic Kidney Disease:   <60 mL/min/1.73sq m  Kidney failure:   <15 mL/min/1.73sq m        Performed at [facility] and [facility], [facility], [address] [place] [address]      GFR Staging 04/22/2016 NOT REPORTED   Final    WBC 04/22/2016 12.9* 3.5 - 11.0 k/uL Final    RBC 04/22/2016 5.23  4.5 - 5.9 m/uL Final    Hemoglobin 04/22/2016 14.0  13.5 - 17.5 g/dL Final    Hematocrit 04/22/2016 44.1  41 - 53 % Final    MCV 04/22/2016 84.3  80 - 100 fL Final    MCH 04/22/2016 26.7  26 - 34 pg Final    MCHC 04/22/2016 31.7  31 - 37 g/dL Final    RDW 04/22/2016 14.1  12.5 - 15.4 % Final    Platelets 20/11/8514 182  140 - 450 k/uL Final    MPV 04/22/2016 9.9  6.0 - 12.0 fL Final    Differential Type 04/22/2016 NOT REPORTED   Final    WBC Morphology 04/22/2016 NOT REPORTED   Final    RBC Morphology 04/22/2016 NOT REPORTED   Final    Platelet Estimate 29/81/4258 NOT REPORTED   Final    Seg Neutrophils 04/22/2016 71* 36 - 66 % Final    Lymphocytes 04/22/2016 20* 24 - 44 % Final    Monocytes 04/22/2016 8  2 - 11 % Final    Eosinophils % 04/22/2016 0* 1 - 4 % Final    Basophils 04/22/2016 1  0 - 2 % Final    Segs Absolute 04/22/2016 9.20* 1.8 - 7.7 k/uL Final    Absolute Lymph # 04/22/2016 2.60  1.0 - 4.8 k/uL Final    Absolute Mono # 04/22/2016 1.10  0.1 - 1.2 k/uL Final    Absolute Eos # 04/22/2016 0.00  0.0 - 0.4 k/uL Final    Basophils Absolute 04/22/2016 0.10  0.0 - 0.2 k/uL Final    Comment: Performed at [id number] [place] [address] [facility] and [address] [place] [address], [address] [place] [address], [address] [place] [address]      Ethanol 04/22/2016 <10  <10 mg/dL Final    Ethanol percent 04/22/2016 <0.010  % Final    Comment: Performed at [facility] and [address] [place] [address], [address] [place] [address], [address] [place] [address]      Albumin 04/22/2016 4.5  3.5 - 5.2 g/dL Final    Alkaline Phosphatase 04/22/2016 53  40 - 129 U/L Final    ALT 04/22/2016 28  5 - 41 U/L Final    AST 04/22/2016 40* <40 U/L Final    Total Bilirubin 04/22/2016 0.60  0.3 - 1.2 mg/dL Final    Bilirubin, Direct 04/22/2016 0.15  <0.31 mg/dL Final    Bilirubin, Indirect 04/22/2016 0.45  0.00 - 1.00 mg/dL Final    Total Protein 04/22/2016 7.8  6.4 - 8.3 g/dL Final    Globulin 04/22/2016 NOT REPORTED  1.5 - 3.8 g/dL Final    Albumin/Globulin Ratio 04/22/2016 1.4  1.0 - 2.5 Final    Comment: Performed at [facility] and [address] [place] [address], [address] [place] [address]      Amphetamine Screen, Ur 04/22/2016 NEGATIVE  NEG Final    Comment:       (Positive cutoff 1000 ng/mL)                  Barbiturate Screen, Ur 04/22/2016 NEGATIVE  NEG Final    Comment:       (Positive cutoff 200 ng/mL)                  Benzodiazepine Screen, Urine 04/22/2016 POSITIVE* NEG Final    Comment:       (Positive cutoff 200 ng/mL)                  Cocaine Metabolite, Urine 04/22/2016 POSITIVE* NEG Final    Comment:       (Positive cutoff 300 ng/mL)                  Methadone Screen, Urine 04/22/2016 NEGATIVE  NEG Final    Comment:       (Positive cutoff 300 ng/mL)                  Opiates, Urine 04/22/2016 NEGATIVE  NEG Final    Comment:       (Positive cutoff 300 ng/mL)                  Phencyclidine, Urine 04/22/2016 NEGATIVE  NEG Final    Comment:       (Positive cutoff 25 ng/mL)                  Propoxyphene, Urine 04/22/2016 NOT REPORTED  NEG Final    Cannabinoid Scrn, Ur 04/22/2016 POSITIVE* NEG Final    Comment:       (Positive cutoff 50 ng/mL)  Oxycodone Screen, Ur 04/22/2016 NEGATIVE  NEG Final    Comment:       (Positive cutoff 100 ng/mL)                  Methamphetamine, Urine 04/22/2016 NOT REPORTED  NEG Final    Tricyclic Antidepressants, Urine 04/22/2016 NOT REPORTED  NEG Final    MDMA, Urine 04/22/2016 NOT REPORTED  NEG Final    Buprenorphine Urine 04/22/2016 NOT REPORTED  NEG Final    Test Information 04/22/2016 Assay provides medical screening only. The absence of expected drug(s) and/or   Final    Comment:  metabolite(s) may indicate diluted or adulterated urine, limitations of testing   or timing of collection. Testing for legal purposes should be confirmed by another method. To request   confirmation of test result, please call the lab within 7 days of sample   submission. Performed at [facility] and [address] [place] [address]      Salicylate Lvl 33/68/6887 <1* 3 - 10 mg/dL Final    Comment: Performed at [facility] and [address] [place] [address]      Ventricular Rate 04/22/2016 87  BPM Final    Atrial Rate 04/22/2016 87  BPM Final    P-R Interval 04/22/2016 124  ms Final    QRS Duration 04/22/2016 88  ms Final    Q-T Interval 04/22/2016 370  ms Final    QTc Calculation (Bazett) 04/22/2016 445  ms Final    P Axis 04/22/2016 52  degrees Final    R Axis 04/22/2016 22  degrees Final    T Axis 04/22/2016 25  degrees Final         Reviewed patient's current plan of care and vital signs with nursing staff.     Labs reviewed: [x] Yes in physical chart      Medications  Current Facility-Administered Medications: haloperidol (HALDOL) tablet 2 mg, 2 mg, Oral, TID  LORazepam (ATIVAN) tablet 2 mg, 2 mg, Oral, Q4H PRN **AND** haloperidol (HALDOL) tablet 5 mg, 5 mg, Oral, Q4H PRN  LORazepam (ATIVAN) injection 2 mg, 2 mg, IntraMUSCular, Q4H PRN **AND** haloperidol lactate (HALDOL) injection 5 mg, 5 mg, IntraMUSCular, Q4H PRN **AND** diphenhydrAMINE (BENADRYL) injection 50 mg, 50 mg, IntraMUSCular, Q4H PRN  acetaminophen (TYLENOL) tablet 650 mg, 650 mg, Oral, Q4H PRN  aluminum & magnesium hydroxide-simethicone (MAALOX) 200-200-20 MG/5ML suspension 30 mL, 30 mL, Oral, Q6H PRN  hydrOXYzine (ATARAX) tablet 50 mg, 50 mg, Oral, TID PRN  ibuprofen (ADVIL;MOTRIN) tablet 400 mg, 400 mg, Oral, Q6H PRN  polyethylene glycol (GLYCOLAX) packet 17 g, 17 g, Oral, Daily PRN  traZODone (DESYREL) tablet 50 mg, 50 mg, Oral, Nightly PRN  nicotine (NICODERM CQ) 14 MG/24HR 1 patch, 1 patch, TransDERmal, Daily  influenza quadrivalent split vaccine (FLUZONE;FLUARIX;FLULAVAL;AFLURIA) injection 0.5 mL, 0.5 mL, IntraMUSCular, Prior to discharge    ASSESSMENT  Major depressive disorder, recurrent, severe with psychotic features ([facility] [address]         PLAN  Patient symptoms are: Modestly Improving. Continue current medication regimen. Increase Haldol to 2 mg 3 times daily  Monitor need and frequency of PRN medications. Encourage participation in groups and milieu. Attempt to develop insight. Psycho-education conducted. Supportive Therapy conducted. Probable discharge is to be determined by MD.   Follow-up daily while inpatient. Patient continues to be monitored in the inpatient psychiatric facility at [facility] for safety and stabilization. Patient continues to need, on a daily basis, active treatment furnished directly by or requiring the supervision of inpatient psychiatric personnel. Electronically signed by MAGY Haywood CNP on 11/17/2021 at 3:15 PM    **This report has been created using voice recognition software. It may contain minor errors which are inherent in voice recognition technology. **    I independently saw and evaluated the patient. I reviewed the nurse practitioners documentation above. Any additional comments or changes to the nurse practitioners documentation are stated below otherwise agree with assessment.   Plan will be as follows:  Patient denying side effects to Haldol. States he still experiencing active auditory hallucinations but has little insight to the fact that they are hallucinations. Believes people are talking negatively about him. Is agreeable to further titrate his Haldol. PLAN  Patient s symptoms   are improving  Increase Haldol to 2 mg by mouth 3 times daily  Attempt to develop insight  Psycho-education conducted. Supportive Therapy conducted.   Probable discharge is undetermined at this time  Follow-up daily while on inpatient unit

## 2021-11-17 NOTE — PLAN OF CARE
Problem: Altered Mood, Depressive Behavior:  Goal: Able to verbalize and/or display a decrease in depressive symptoms  Description: Able to verbalize and/or display a decrease in depressive symptoms  11/17/2021 1255 by Emy Osborn RN  Outcome: Ongoing  Patient is out on the unit in intervals, aloof of peers and staff, but does approach nurses station for needs and is able to verbalize needs clearly. Patient admits so some depression but is evasive upon approach. Patient took all medications as prescribed and admits to high levels of anxiety. 11/16/2021 2317 by Shruthi Lackey RN  Outcome: Ongoing  Note: Patient is sleeping most of shift, appears anxious and paranoid. Denies suicidal ideation and thoughts of self harm. Goal: Ability to disclose and discuss suicidal ideas will improve  Description: Ability to disclose and discuss suicidal ideas will improve  Outcome: Ongoing  Patient denies any thoughts to harm self or others. Goal: Absence of self-harm  Description: Absence of self-harm  11/17/2021 1255 by Emy Osborn RN  Outcome: Ongoing  No self harm noted. 11/16/2021 2317 by Shruthi Lackey RN  Outcome: Ongoing  Note: Patient remains absent of self harm. Problem: Tobacco Use:  Goal: Inpatient tobacco use cessation counseling participation  Description: Inpatient tobacco use cessation counseling participation  11/17/2021 1255 by Emy Osborn RN  Outcome: Ongoing  Patient refused smoking cessation counseling. 11/16/2021 2317 by Shruthi Lackey RN  Outcome: Ongoing  Note: Patient given tobacco quitline number 22404232998 at this time, refusing to call at this time, states \" I just dont want to quit now\"- patient given information as to the dangers of long term tobacco use. Continue to reinforce the importance of tobacco cessation.        Problem: Pain:  Goal: Pain level will decrease  Description: Pain level will decrease  11/17/2021 1255 by Emy Osborn RN  Outcome: Ongoing  Patient denies any pain at this time. 11/16/2021 2317 by Savage Pierre RN  Outcome: Ongoing  Note: Patient denies pain on assessment.

## 2021-11-17 NOTE — GROUP NOTE
Group Therapy Note    Date: 11/17/2021    Group Start Time: 1320  Group End Time: 1400  Group Topic: Cognitive Skills    [address], [facility]        Group Therapy Note    Attendees: 4/18    patient refused to attend triggers to anxiety group at [id number] after encouragement from staff. 1:1 talk time provided as alternative to group session.

## 2021-11-17 NOTE — PROGRESS NOTES
Daily Progress Note  11/16/2021    Patient Name: Coy Wilkins:   Suicidal ideation with a plan to overdose, paranoia, delusional thoughts         PRNs: No emergency medications administered since admission     Scheduled medications: Adherent     SW discharge plan:  Lives in a home with three children and a long-term girlfriend    SUBJECTIVE:     Patient remains paranoid and agitated on the unit. Required emergency p.o. medications Haldol 5 mg and Ativan 2 mg at 1616 today. Staff report that patient is anxious, pacing the unit, rapid and pressured speech, making persecutory statements, and verbalizing desire to call his . During assessment, patient appeared to be internally stimulated, but is evasive and states that he feels much better. Affect is not congruent. Eyes are darting and he makes intense eye contact with writer at times. We discussed initiation of Haldol and patient denies any side effects. Denies any muscle rigidity or EPS symptoms. Patient reports that he was able to sleep better for the first night in \"perhaps weeks\". Per review of purposeful rounding, patient was in his room sleeping for approximately 8 hours without interruption the previous evening. Patient is guarded when asked if he is experiencing auditory hallucinations. States that \"everything is good\". Patient is restless, requests to speak with physician. Walks away from [facility]. Interview terminated at this time. He has yet to demonstrate stability and requires inpatient hospitalization for safety.      Appetite:  [] Normal/Adequate/Unchanged  [] Increased  [x] Decreased      Sleep:       [x] Normal/Adequate/Unchanged  [] Fair  [x] Poor      Group Attendance on Unit:   [] Yes  [x] Selectively    [] No    Medication Side Effects: Denies         Mental Status Exam  Level of consciousness: Alert and awake   Appearance: Appropriate attire for setting, seated in chair, with fair grooming and hygiene Behavior/Motor: Restless, psychomotor agitation  Attitude toward examiner: Paranoid, guarded, intense eye contact, suspicious  Speech: delayed responses, rapid at times, good articulation  Mood: \"much better\"  Affect: Anxious, non-congruent  Thought processes: linear and thought blocking   Thought content: Denies homicidal ideation  Suicidal Ideation: Provided no response  Delusions: Paranoid and persecutory  Perceptual Disturbance: Denies, appears to be responding to internal stimuli  Cognition: Oriented to self,, location, and time, and not situation memory: intact  Insight & Judgement: poor     Data   height is 5' 8\" (1.727 m) and weight is 198 lb (89.8 kg). His oral temperature is 98.1 °F (36.7 °C). His blood pressure is 120/62 and his pulse is 78. His respiration is 16. Labs:   No visits with results within 2 Day(s) from this visit.    Latest known visit with results is:   Admission on 04/22/2016, Discharged on 04/22/2016   Component Date Value Ref Range Status    Acetaminophen Level 04/22/2016 <10* 10 - 30 ug/mL Final    Comment: Performed at [facility] Emergency Dept and [address] [facility], [address] [place] [address], [address] [place] [address]      Glucose 04/22/2016 95  70 - 99 mg/dL Final    BUN 04/22/2016 13  6 - 20 mg/dL Final    CREATININE 04/22/2016 1.10  0.70 - 1.20 mg/dL Final    Bun/Cre Ratio 04/22/2016 NOT REPORTED  9 - 20 Final    Calcium 04/22/2016 9.4  8.6 - 10.4 mg/dL Final    Sodium 04/22/2016 139  135 - 144 mmol/L Final    Potassium 04/22/2016 3.8  3.7 - 5.3 mmol/L Final    Chloride 04/22/2016 100  98 - 107 mmol/L Final    CO2 04/22/2016 24  20 - 31 mmol/L Final    Anion Gap 04/22/2016 15  9 - 17 mmol/L Final    GFR Non- 04/22/2016 >60  >60 mL/min Final    GFR  04/22/2016 >60  >60 mL/min Final    GFR Comment 04/22/2016        Final    Comment: Average GFR for 38-51 years old:   80 mL/min/1.73sq m  Chronic Kidney Disease:   <60 mL/min/1.73sq m  Kidney failure:   <15 mL/min/1.73sq m        Performed at [facility] Emergency Dept and [address] [place] [address]      GFR Staging 04/22/2016 NOT REPORTED   Final    WBC 04/22/2016 12.9* 3.5 - 11.0 k/uL Final    RBC 04/22/2016 5.23  4.5 - 5.9 m/uL Final    Hemoglobin 04/22/2016 14.0  13.5 - 17.5 g/dL Final    Hematocrit 04/22/2016 44.1  41 - 53 % Final    MCV 04/22/2016 84.3  80 - 100 fL Final    MCH 04/22/2016 26.7  26 - 34 pg Final    MCHC 04/22/2016 31.7  31 - 37 g/dL Final    RDW 04/22/2016 14.1  12.5 - 15.4 % Final    Platelets 95/33/3671 182  140 - 450 k/uL Final    MPV 04/22/2016 9.9  6.0 - 12.0 fL Final    Differential Type 04/22/2016 NOT REPORTED   Final    WBC Morphology 04/22/2016 NOT REPORTED   Final    RBC Morphology 04/22/2016 NOT REPORTED   Final    Platelet Estimate 50/65/5391 NOT REPORTED   Final    Seg Neutrophils 04/22/2016 71* 36 - 66 % Final    Lymphocytes 04/22/2016 20* 24 - 44 % Final    Monocytes 04/22/2016 8  2 - 11 % Final    Eosinophils % 04/22/2016 0* 1 - 4 % Final    Basophils 04/22/2016 1  0 - 2 % Final    Segs Absolute 04/22/2016 9.20* 1.8 - 7.7 k/uL Final    Absolute Lymph # 04/22/2016 2.60  1.0 - 4.8 k/uL Final    Absolute Mono # 04/22/2016 1.10  0.1 - 1.2 k/uL Final    Absolute Eos # 04/22/2016 0.00  0.0 - 0.4 k/uL Final    Basophils Absolute 04/22/2016 0.10  0.0 - 0.2 k/uL Final    Comment: Performed at [facility] Emergency Dept and [address] [facility], [address] [place] [address]      Ethanol 04/22/2016 <10  <10 mg/dL Final    Ethanol percent 04/22/2016 <0.010  % Final    Comment: Performed at [facility] Emergency Dept and [address], [address] [facility]  , [facility], [address] [facility]      Albumin 04/22/2016 4.5  3.5 - 5.2 g/dL Final    Alkaline Phosphatase 04/22/2016 53  40 - 129 U/L Final    ALT 04/22/2016 28  5 - 41 U/L Final    AST 04/22/2016 40* <40 U/L Final    Total Bilirubin 04/22/2016 0.60  0.3 - 1.2 mg/dL Final    Bilirubin, Direct 04/22/2016 0.15  <0.31 mg/dL Final    Bilirubin, Indirect 04/22/2016 0.45  0.00 - 1.00 mg/dL Final    Total Protein 04/22/2016 7.8  6.4 - 8.3 g/dL Final    Globulin 04/22/2016 NOT REPORTED  1.5 - 3.8 g/dL Final    Albumin/Globulin Ratio 04/22/2016 1.4  1.0 - 2.5 Final    Comment: Performed at [facility] Emergency Dept and [address], [address] [facility]  , [facility], [address] [facility] [address]      Amphetamine Screen, Ur 04/22/2016 NEGATIVE  NEG Final    Comment:       (Positive cutoff 1000 ng/mL)                  Barbiturate Screen, Ur 04/22/2016 NEGATIVE  NEG Final    Comment:       (Positive cutoff 200 ng/mL)                  Benzodiazepine Screen, Urine 04/22/2016 POSITIVE* NEG Final    Comment:       (Positive cutoff 200 ng/mL)                  Cocaine Metabolite, Urine 04/22/2016 POSITIVE* NEG Final    Comment:       (Positive cutoff 300 ng/mL)                  Methadone Screen, Urine 04/22/2016 NEGATIVE  NEG Final    Comment:       (Positive cutoff 300 ng/mL)                  Opiates, Urine 04/22/2016 NEGATIVE  NEG Final    Comment:       (Positive cutoff 300 ng/mL)                  Phencyclidine, Urine 04/22/2016 NEGATIVE  NEG Final    Comment:       (Positive cutoff 25 ng/mL)                  Propoxyphene, Urine 04/22/2016 NOT REPORTED  NEG Final    Cannabinoid Scrn, Ur 04/22/2016 POSITIVE* NEG Final    Comment:       (Positive cutoff 50 ng/mL)                  Oxycodone Screen, Ur 04/22/2016 NEGATIVE  NEG Final    Comment:       (Positive cutoff 100 ng/mL)                  Methamphetamine, Urine 04/22/2016 NOT REPORTED  NEG Final    Tricyclic Antidepressants, Urine 04/22/2016 NOT REPORTED  NEG Final    MDMA, Urine 04/22/2016 NOT REPORTED  NEG Final    Buprenorphine Urine 04/22/2016 NOT REPORTED  NEG Final    Test Information 04/22/2016 Assay provides medical screening only.   The absence of expected drug(s) and/or   Final    Comment:  metabolite(s) may indicate diluted or adulterated urine, limitations of testing   or timing of collection. Testing for legal purposes should be confirmed by another method. To request   confirmation of test result, please call the lab within 7 days of sample   submission. Performed at [facility] Emergency Dept and [address] [place] [address]      Salicylate Lvl 21/64/7349 <1* 3 - 10 mg/dL Final    Comment: Performed at [facility] Dept and [address] [place] [address]      Ventricular Rate 04/22/2016 87  BPM Final    Atrial Rate 04/22/2016 87  BPM Final    P-R Interval 04/22/2016 124  ms Final    QRS Duration 04/22/2016 88  ms Final    Q-T Interval 04/22/2016 370  ms Final    QTc Calculation (Bazett) 04/22/2016 445  ms Final    P Axis 04/22/2016 52  degrees Final    R Axis 04/22/2016 22  degrees Final    T Axis 04/22/2016 25  degrees Final         Reviewed patient's current plan of care and vital signs with nursing staff.     Labs reviewed: [x] Yes  Last EKG in EMR reviewed: [x] Yes    Medications  Current Facility-Administered Medications: LORazepam (ATIVAN) tablet 2 mg, 2 mg, Oral, Q4H PRN **AND** haloperidol (HALDOL) tablet 5 mg, 5 mg, Oral, Q4H PRN  LORazepam (ATIVAN) injection 2 mg, 2 mg, IntraMUSCular, Q4H PRN **AND** haloperidol lactate (HALDOL) injection 5 mg, 5 mg, IntraMUSCular, Q4H PRN **AND** diphenhydrAMINE (BENADRYL) injection 50 mg, 50 mg, IntraMUSCular, Q4H PRN  haloperidol (HALDOL) tablet 1 mg, 1 mg, Oral, TID  acetaminophen (TYLENOL) tablet 650 mg, 650 mg, Oral, Q4H PRN  aluminum & magnesium hydroxide-simethicone (MAALOX) 200-200-20 MG/5ML suspension 30 mL, 30 mL, Oral, Q6H PRN  hydrOXYzine (ATARAX) tablet 50 mg, 50 mg, Oral, TID PRN  ibuprofen (ADVIL;MOTRIN) tablet 400 mg, 400 mg, Oral, Q6H PRN  polyethylene glycol (GLYCOLAX) packet 17 g, 17 g, Oral, Daily PRN  traZODone (DESYREL) tablet 50 mg, 50 mg, Oral, Nightly PRN  nicotine (NICODERM CQ) 14 MG/24HR 1 patch, 1 patch, TransDERmal, Daily  influenza quadrivalent split vaccine (FLUZONE;FLUARIX;FLULAVAL;AFLURIA) injection 0.5 mL, 0.5 mL, IntraMUSCular, Prior to discharge    ASSESSMENT  Major depressive disorder, recurrent, severe with psychotic features ([facility] [address]    Rule out schizoaffective disorder         PLAN  Patient symptoms are: Remains Unstable  Consider titrating Haldol 2 mg 3 times daily  Monitor need and frequency of PRN medications  Encourage participation in groups and milieu  Attempt to develop insight  Psycho-education conducted  Supportive Therapy conducted  Probable discharge: Per attending MD  Follow-up daily while inpatient    Patient continues to be monitored in the inpatient psychiatric facility at [facility] for safety and stabilization. Patient continues to need, on a daily basis, active treatment furnished directly by or requiring the supervision of inpatient psychiatric personnel. Electronically signed by MAGY Ramachandran CNP on 11/16/2021 at 7:01 PM    **This report has been created using voice recognition software. It may contain minor errors which are inherent in voice recognition technology. **    I independently saw and evaluated the patient. I reviewed the nurse practitioners documentation above. Any additional comments or changes to the nurse practitioners documentation are stated below otherwise agree with assessment. Plan will be as follows:  Spent 30 minutes with the patient, of that greater than 16 minutes was spent in supportive psychotherapy. I did see the patient prior to his emergency medication. He was denying any side effects to medication and was reporting some improvement in symptoms. Agreeable to further titrate Haldol after further observation, likely tomorrow. Required emergency medications after our discussion.  He still endorsing auditory hallucinations but less intense and is appeared less paranoid during my evaluation but subsequently as reported by staff became much more paranoid and agitated  PLAN  Patient s symptoms   fluctuating  We will likely increase Haldol tomorrow  Attempt to develop insight  Psycho-education conducted. Supportive Therapy conducted.   Probable discharge is undetermined at this time  Follow-up daily while on inpatient unit

## 2021-11-17 NOTE — FLOWSHEET NOTE
*Patient participated in [facility]        11/17/21 1531   Encounter Summary   Services provided to: Patient   Referral/Consult From: Rounding   Continue Visiting   (11/17/21)   Complexity of Encounter Moderate   Length of Encounter 30 minutes   Spiritual Assessment Completed Yes   Spiritual/Mosque   Type Spiritual support   Assessment Calm;  Approachable   Intervention Active listening; Prayer   Outcome Receptive; Engaged in conversation; Expressed gratitude

## 2021-11-17 NOTE — PLAN OF CARE
Problem: Altered Mood, Depressive Behavior:  Goal: Able to verbalize and/or display a decrease in depressive symptoms  Description: Able to verbalize and/or display a decrease in depressive symptoms  11/16/2021 2317 by Dianne Tariq RN  Outcome: Ongoing  Note: Patient is sleeping most of shift, appears anxious and paranoid. Denies suicidal ideation and thoughts of self harm. Problem: Altered Mood, Depressive Behavior:  Goal: Absence of self-harm  Description: Absence of self-harm  11/16/2021 2317 by Dianne Tariq RN  Outcome: Ongoing  Note: Patient remains absent of self harm. Problem: Tobacco Use:  Goal: Inpatient tobacco use cessation counseling participation  Description: Inpatient tobacco use cessation counseling participation  Outcome: Ongoing  Note: Patient given tobacco quitline number 34856719935 at this time, refusing to call at this time, states \" I just dont want to quit now\"- patient given information as to the dangers of long term tobacco use. Continue to reinforce the importance of tobacco cessation.

## 2021-11-18 PROCEDURE — 6370000000 HC RX 637 (ALT 250 FOR IP): Performed by: PSYCHIATRY & NEUROLOGY

## 2021-11-18 PROCEDURE — 99232 SBSQ HOSP IP/OBS MODERATE 35: CPT | Performed by: PSYCHIATRY & NEUROLOGY

## 2021-11-18 PROCEDURE — 6360000002 HC RX W HCPCS: Performed by: PSYCHIATRY & NEUROLOGY

## 2021-11-18 PROCEDURE — 6370000000 HC RX 637 (ALT 250 FOR IP)

## 2021-11-18 PROCEDURE — APPSS30 APP SPLIT SHARED TIME 16-30 MINUTES

## 2021-11-18 PROCEDURE — 1240000000 HC EMOTIONAL WELLNESS R&B

## 2021-11-18 PROCEDURE — 90833 PSYTX W PT W E/M 30 MIN: CPT | Performed by: PSYCHIATRY & NEUROLOGY

## 2021-11-18 RX ORDER — HALOPERIDOL DECANOATE 100 MG/ML
100 INJECTION INTRAMUSCULAR ONCE
Status: COMPLETED | OUTPATIENT
Start: 2021-11-18 | End: 2021-11-18

## 2021-11-18 RX ORDER — DIPHENHYDRAMINE HCL 25 MG
25 TABLET ORAL EVERY 8 HOURS PRN
Status: DISCONTINUED | OUTPATIENT
Start: 2021-11-18 | End: 2021-11-20 | Stop reason: HOSPADM

## 2021-11-18 RX ADMIN — HALOPERIDOL 2 MG: 1 TABLET ORAL at 14:15

## 2021-11-18 RX ADMIN — HYDROXYZINE HYDROCHLORIDE 50 MG: 50 TABLET, FILM COATED ORAL at 10:51

## 2021-11-18 RX ADMIN — HALOPERIDOL DECANOATE 100 MG: 100 INJECTION INTRAMUSCULAR at 17:45

## 2021-11-18 RX ADMIN — DIPHENHYDRAMINE HCL 25 MG: 25 TABLET ORAL at 14:16

## 2021-11-18 RX ADMIN — HALOPERIDOL 2 MG: 1 TABLET ORAL at 08:29

## 2021-11-18 ASSESSMENT — PAIN SCALES - GENERAL: PAINLEVEL_OUTOF10: 0

## 2021-11-18 NOTE — PLAN OF CARE
Problem: Altered Mood, Depressive Behavior:  Goal: Able to verbalize and/or display a decrease in depressive symptoms  Description: Able to verbalize and/or display a decrease in depressive symptoms  11/17/2021 2327 by Jenna Hi RN  Outcome: Ongoing     Problem: Altered Mood, Depressive Behavior:  Goal: Ability to disclose and discuss suicidal ideas will improve  Description: Ability to disclose and discuss suicidal ideas will improve  11/17/2021 2327 by Jenna Hi RN  Outcome: Ongoing  Note:  Pt denies suicidal ideations at this time. Pt agreed to seek staff at anytime he/she felt like any urges to harm self would arise. Safety checks maintained [id number]. Problem: Altered Mood, Depressive Behavior:  Goal: Absence of self-harm  Description: Absence of self-harm  11/17/2021 2327 by Jenna Hi RN  Outcome: Ongoing  Note: Patient is free of self harm at this time. Patient agrees to seek out staff if thoughts to harm self arise. Staff will provide support and reassurance as needed. Safety checks maintained every 15 minutes. Problem: Tobacco Use:  Goal: Inpatient tobacco use cessation counseling participation  Description: Inpatient tobacco use cessation counseling participation  11/17/2021 2327 by Jenna Hi RN  Outcome: Ongoing     Problem: Pain:  Goal: Pain level will decrease  Description: Pain level will decrease  11/17/2021 2327 by Jenna Hi RN  Outcome: Ongoing  Note: Patient reports no new pain, or increase in chronic pain. Will continue to monitor and provide as needed pain medication.        Problem: Pain:  Goal: Control of acute pain  Description: Control of acute pain  11/17/2021 2327 by Jenna Hi RN  Outcome: Ongoing     Problem: Pain:  Goal: Control of chronic pain  Description: Control of chronic pain  11/17/2021 2327 by Jenna Hi RN  Outcome: Ongoing

## 2021-11-18 NOTE — GROUP NOTE
Group Therapy Note    Date: 11/18/2021    Group Start Time: 1430  Group End Time: 5632  Group Topic: Community Meeting    BJ Byrd    Group Therapy Note    Attendees: 11    Patient's Goal:  Pt will identify ways to advocate and raise awareness for mental health    Notes:  Pt attended group and participated    Status After Intervention:  Improved    Participation Level:  Active Listener and Interactive    Participation Quality: Appropriate, Attentive, Sharing and Supportive      Speech:  normal      Thought Process/Content: Logical  Linear      Affective Functioning: Constricted      Mood: euthymic      Level of consciousness:  Alert, Oriented x4 and Attentive      Response to Learning: Able to verbalize current knowledge/experience, Able to verbalize/acknowledge new learning, Able to retain information, Capable of insight, Able to change behavior and Progressing to goal      Endings: None Reported    Modes of Intervention: Education, Support, Socialization, Exploration and Limit-setting      Discipline Responsible: [facility] [address], [facility] Tech      Signature:  Virgen Zamora, [address]

## 2021-11-18 NOTE — PLAN OF CARE
Problem: Altered Mood, Depressive Behavior:  Goal: Able to verbalize and/or display a decrease in depressive symptoms  Description: Able to verbalize and/or display a decrease in depressive symptoms  11/18/2021 0945 by Sveta Webb RN  Outcome: Ongoing  Patient states that they feel slightly depressed today. Patient reports a decrease in depressive symptoms since admission. Patient is out in dayroom socializing and attending groups. Improvement in symptoms shown. Q15 minute checks in place for patient safety. Problem: Altered Mood, Depressive Behavior:  Goal: Ability to disclose and discuss suicidal ideas will improve  Description: Ability to disclose and discuss suicidal ideas will improve  11/18/2021 0945 by Sveta Webb RN  Outcome: Ongoing  Patient denies any suicidal ideas at this time. Patient was recently suicidal but states that these thoughts have improved. Patient contracts for safety. If thoughts of suicidal arise, patient will contact a staff member immediately. Q15 minute checks in place for patient safety. Patient remains safe at this time. Problem: Altered Mood, Depressive Behavior:  Goal: Absence of self-harm  Description: Absence of self-harm  11/18/2021 0945 by Sveta Webb RN  Outcome: Ongoing  Patient has remained free from self-harm during admission. Patient does not feel the need to harm themselves. If these types of thoughts arise, patient will notify a staff member. Q15 Minute checks in place for patient safety. Problem: Tobacco Use:  Goal: Inpatient tobacco use cessation counseling participation  Description: Inpatient tobacco use cessation counseling participation  11/18/2021 0945 by Sveta Webb RN  Outcome: Ongoing  Patient is not interested in tobacco cessation at this time.       Problem: Pain:  Goal: Pain level will decrease  Description: Pain level will decrease  11/17/2021 2327 by Ilene Godfrey RN  Outcome: Ongoing  Note: Patient reports no new pain, or increase in chronic pain. Will continue to monitor and provide as needed pain medication. Problem: Pain:  Goal: Control of chronic pain  Description: Control of chronic pain  11/18/2021 0945 by Marcela Reyes RN  Outcome: Ongoing  Patient is not interested in tobacco cessation at this time.

## 2021-11-18 NOTE — PROGRESS NOTES
Daily Progress Note  11/18/2021    Patient Name: Stan Gift: Suicidal ideation with a plan to overdose, paranoia, delusional thoughts         SUBJECTIVE:      Patient is seen today for a follow up assessment. Patient is compliant with scheduled Haldol. Patient has not required emergency medications in the past 24 hours. This writer received a perfect serve from nursing staff that reported patient had come to nurses station complaining of a swollen tongue. Patient was assessed by nursing staff at that time and per nursing staff patient did not appear to have a swollen tongue. Nursing staff reported that patient had been talking with peers about side effects of Haldol and suspects patient became paranoid about this. This writer came and assessed patient at bedside. Patient did not appear to have any signs or symptoms of a swollen tongue and patient reported \"it's better now but it comes and goes. \" This writer told patient she  would order oral benadryl if needed and patient was in agreement. He stated that the side effects were not bad enough to want to discontinue Haldol. Patient reports he feels somewhat better today but continues to endorse feelings of depression and anxiety. He reports his depression is improving but rates his anxiety as a 7 (0-10 scale with 0 being none and 10 being the worst). He reports he slept well last night and that his appetite has been adequate. Patient endorses fleeting suicidal ideation without current plan or intent. He denies homicidal ideation. He is able to contract for safety on unit with this writer. He continues to express feelings of paranoia that people are \"trying to steal his identity. \" He reports, \"I've been getting weird mail and my Internet was hacked at home. \"  He reports improvement in auditory and visual hallucinations. He denies other medical concerns at this time.  He does ask this writer about the Haldol Injection and seems interested by it. At this time, the patient is not appropriate for a lower level of care. There is risk of decompensation and patient warrants further hospitalization for safety and stabilization. Appetite:  [x] Normal/Adequate/Unchanged  [] Increased  [] Decreased      Sleep:       [] Normal/Adequate/Unchanged  [x] Fair  [] Poor      Group Attendance on Unit:   [x] Yes  [] Selectively    [] No    Medication Side Effects: Patient reports tongue swelling         Mental Status Exam  Level of consciousness: Alert and awake. Appearance: Appropriate attire for setting, resting in bed, with fair  grooming and hygiene. Behavior/Motor: Approachable, no psychomotor abnormalities. Attitude toward examiner: Cooperative, attentive, poor eye contact. Speech: Normal rate, normal volume, normal tone. Mood:  Patient reports \"I'm okay\". Affect: Anxious  Thought processes: Linear and coherent, illogical at times. Thought content: Denies homicidal ideation. Suicidal Ideation: Fleeting suicidal ideations, without current plan or intent, contracts for safety on the unit. Delusions: Patient presents with delusions. Endorses paranoia. Perceptual Disturbance: Patient does not appear to be responding to internal stimuli. Reports improvement in auditory hallucinations. Reports improvement in visual hallucinations. Cognition: Oriented to self, location, time, and situation. Memory: Intact. Insight & Judgement: Poor. Data   height is 5' 8\" (1.727 m) and weight is 198 lb (89.8 kg). His temporal temperature is 97.1 °F (36.2 °C). His blood pressure is 136/89 and his pulse is 92. His respiration is 14. Labs:   No visits with results within 2 Day(s) from this visit.    Latest known visit with results is:   Admission on 04/22/2016, Discharged on 04/22/2016   Component Date Value Ref Range Status    Acetaminophen Level 04/22/2016 <10* 10 - 30 ug/mL Final    Comment: Performed at [facility] Emergency Dept and [facility], [address] [place] [address]      Glucose 04/22/2016 95  70 - 99 mg/dL Final    BUN 04/22/2016 13  6 - 20 mg/dL Final    CREATININE 04/22/2016 1.10  0.70 - 1.20 mg/dL Final    Bun/Cre Ratio 04/22/2016 NOT REPORTED  9 - 20 Final    Calcium 04/22/2016 9.4  8.6 - 10.4 mg/dL Final    Sodium 04/22/2016 139  135 - 144 mmol/L Final    Potassium 04/22/2016 3.8  3.7 - 5.3 mmol/L Final    Chloride 04/22/2016 100  98 - 107 mmol/L Final    CO2 04/22/2016 24  20 - 31 mmol/L Final    Anion Gap 04/22/2016 15  9 - 17 mmol/L Final    GFR Non- 04/22/2016 >60  >60 mL/min Final    GFR  04/22/2016 >60  >60 mL/min Final    GFR Comment 04/22/2016        Final    Comment: Average GFR for 2649 years old:   80 mL/min/1.73sq m  Chronic Kidney Disease:   <60 mL/min/1.73sq m  Kidney failure:   <15 mL/min/1.73sq m        Performed at [facility] Emergency Dept and [facility], [address] [place] [address]      GFR Staging 04/22/2016 NOT REPORTED   Final    WBC 04/22/2016 12.9* 3.5 - 11.0 k/uL Final    RBC 04/22/2016 5.23  4.5 - 5.9 m/uL Final    Hemoglobin 04/22/2016 14.0  13.5 - 17.5 g/dL Final    Hematocrit 04/22/2016 44.1  41 - 53 % Final    MCV 04/22/2016 84.3  80 - 100 fL Final    MCH 04/22/2016 26.7  26 - 34 pg Final    MCHC 04/22/2016 31.7  31 - 37 g/dL Final    RDW 04/22/2016 14.1  12.5 - 15.4 % Final    Platelets 53/44/3728 182  140 - 450 k/uL Final    MPV 04/22/2016 9.9  6.0 - 12.0 fL Final    Differential Type 04/22/2016 NOT REPORTED   Final    WBC Morphology 04/22/2016 NOT REPORTED   Final    RBC Morphology 04/22/2016 NOT REPORTED   Final    Platelet Estimate 22/88/7760 NOT REPORTED   Final    Seg Neutrophils 04/22/2016 71* 36 - 66 % Final    Lymphocytes 04/22/2016 20* 24 - 44 % Final    Monocytes 04/22/2016 8  2 - 11 % Final    Eosinophils % 04/22/2016 0* 1 - 4 % Final    Basophils 04/22/2016 1  0 - 2 % Final    Segs 300 ng/mL)                  Phencyclidine, Urine 04/22/2016 NEGATIVE  NEG Final    Comment:       (Positive cutoff 25 ng/mL)                  Propoxyphene, Urine 04/22/2016 NOT REPORTED  NEG Final    Cannabinoid Scrn, Ur 04/22/2016 POSITIVE* NEG Final    Comment:       (Positive cutoff 50 ng/mL)                  Oxycodone Screen, Ur 04/22/2016 NEGATIVE  NEG Final    Comment:       (Positive cutoff 100 ng/mL)                  Methamphetamine, Urine 04/22/2016 NOT REPORTED  NEG Final    Tricyclic Antidepressants, Urine 04/22/2016 NOT REPORTED  NEG Final    MDMA, Urine 04/22/2016 NOT REPORTED  NEG Final    Buprenorphine Urine 04/22/2016 NOT REPORTED  NEG Final    Test Information 04/22/2016 Assay provides medical screening only. The absence of expected drug(s) and/or   Final    Comment:  metabolite(s) may indicate diluted or adulterated urine, limitations of testing   or timing of collection. Testing for legal purposes should be confirmed by another method. To request   confirmation of test result, please call the lab within 7 days of sample   submission. Performed at [facility] Emergency Dept and [address] [place] [address]      Salicylate Lvl 06/49/8136 <1* 3 - 10 mg/dL Final    Comment: Performed at [facility] Emergency Dept and [address] [place] [address]      Ventricular Rate 04/22/2016 87  BPM Final    Atrial Rate 04/22/2016 87  BPM Final    P-R Interval 04/22/2016 124  ms Final    QRS Duration 04/22/2016 88  ms Final    Q-T Interval 04/22/2016 370  ms Final    QTc Calculation (Bazett) 04/22/2016 445  ms Final    P Axis 04/22/2016 52  degrees Final    R Axis 04/22/2016 22  degrees Final    T Axis 04/22/2016 25  degrees Final         Reviewed patient's current plan of care and vital signs with nursing staff.     Labs reviewed: [x] Yes in physical chart      Medications  Current Facility-Administered Medications: diphenhydrAMINE (BENADRYL) tablet 25 mg, 25 mg, Oral, Q8H PRN  haloperidol (HALDOL) tablet 2 mg, 2 mg, Oral, TID  LORazepam (ATIVAN) tablet 2 mg, 2 mg, Oral, Q4H PRN **AND** haloperidol (HALDOL) tablet 5 mg, 5 mg, Oral, Q4H PRN  LORazepam (ATIVAN) injection 2 mg, 2 mg, IntraMUSCular, Q4H PRN **AND** haloperidol lactate (HALDOL) injection 5 mg, 5 mg, IntraMUSCular, Q4H PRN **AND** diphenhydrAMINE (BENADRYL) injection 50 mg, 50 mg, IntraMUSCular, Q4H PRN  acetaminophen (TYLENOL) tablet 650 mg, 650 mg, Oral, Q4H PRN  aluminum & magnesium hydroxide-simethicone (MAALOX) 200-200-20 MG/5ML suspension 30 mL, 30 mL, Oral, Q6H PRN  hydrOXYzine (ATARAX) tablet 50 mg, 50 mg, Oral, TID PRN  ibuprofen (ADVIL;MOTRIN) tablet 400 mg, 400 mg, Oral, Q6H PRN  polyethylene glycol (GLYCOLAX) packet 17 g, 17 g, Oral, Daily PRN  traZODone (DESYREL) tablet 50 mg, 50 mg, Oral, Nightly PRN  nicotine (NICODERM CQ) 14 MG/24HR 1 patch, 1 patch, TransDERmal, Daily  influenza quadrivalent split vaccine (FLUZONE;FLUARIX;FLULAVAL;AFLURIA) injection 0.5 mL, 0.5 mL, IntraMUSCular, Prior to discharge    ASSESSMENT  Major depressive disorder, recurrent, severe with psychotic features ([facility] [address]         PLAN  Patient symptoms are: Modestly Improving. Continue current medication regimen. Watch for side effects on Haldol  As long as patient continues to tolerate consider Haldol Deconate Injection  Monitor need and frequency of PRN medications. Encourage participation in groups and milieu. Attempt to develop insight. Psycho-education conducted. Supportive Therapy conducted. Probable discharge is to be determined by MD.   Follow-up daily while inpatient. Patient continues to be monitored in the inpatient psychiatric facility at [facility] for safety and stabilization. Patient continues to need, on a daily basis, active treatment furnished directly by or requiring the supervision of inpatient psychiatric personnel.     Electronically signed by MAGY Malone CNP on

## 2021-11-18 NOTE — GROUP NOTE
Group Therapy Note    Date: 11/18/2021    Group Start Time: 0900  Group End Time: 5705  Group Topic: Community Meeting    [facility]    Patient refused to attend community meeting and goal setting group at 0900 after encouragement from staff. 1:1 talk time offered by staff as alternative to group session.

## 2021-11-18 NOTE — GROUP NOTE
Group Therapy Note    Date: 11/18/2021    Group Start Time: 1050  Group End Time: 1200  Group Topic: Recreational    [address], [place]        Group Therapy Note    Attendees: 5/16         Patient's Goal:  Develop effective problem solving techniques. Status After Intervention:  Improved    Participation Level:  Active Listener and Interactive    Participation Quality: Appropriate, Attentive, Sharing and Supportive      Speech:  normal      Thought Process/Content: Logical      Affective Functioning: Congruent      Mood: euphoric      Level of consciousness:  Alert, Oriented x4 and Attentive      Response to Learning: Able to verbalize current knowledge/experience, Able to verbalize/acknowledge new learning and Able to retain information    Modes of Intervention: Education, Support, Exploration, Problem-solving and Activity      Discipline Responsible: Psychoeducational Specialist      Signature:  Cecily Lira

## 2021-11-18 NOTE — GROUP NOTE
Group Therapy Note    Date: 11/18/2021    Group Start Time: 1330  Group End Time: 1430  Group Topic: Cognitive Skills    ST SURESH Chopra, CTRS        Group Therapy Note    Attendees: 6/15    patient refused to attend  creative expression group at [id number] after encouragement from staff. 1:1 talk time provided as alternative to group session.

## 2021-11-19 PROCEDURE — 6370000000 HC RX 637 (ALT 250 FOR IP): Performed by: PSYCHIATRY & NEUROLOGY

## 2021-11-19 PROCEDURE — 6370000000 HC RX 637 (ALT 250 FOR IP)

## 2021-11-19 PROCEDURE — 99232 SBSQ HOSP IP/OBS MODERATE 35: CPT | Performed by: PSYCHIATRY & NEUROLOGY

## 2021-11-19 PROCEDURE — 1240000000 HC EMOTIONAL WELLNESS R&B

## 2021-11-19 PROCEDURE — APPSS30 APP SPLIT SHARED TIME 16-30 MINUTES

## 2021-11-19 PROCEDURE — 90833 PSYTX W PT W E/M 30 MIN: CPT | Performed by: PSYCHIATRY & NEUROLOGY

## 2021-11-19 RX ORDER — HALOPERIDOL 2 MG/1
2 TABLET ORAL 3 TIMES DAILY
Qty: 21 TABLET | Refills: 0 | Status: SHIPPED | OUTPATIENT
Start: 2021-11-19 | End: 2022-02-22

## 2021-11-19 RX ORDER — HYDROXYZINE 50 MG/1
50 TABLET, FILM COATED ORAL 3 TIMES DAILY PRN
Qty: 60 TABLET | Refills: 0 | Status: SHIPPED | OUTPATIENT
Start: 2021-11-19 | End: 2021-12-09

## 2021-11-19 RX ORDER — TRAZODONE HYDROCHLORIDE 50 MG/1
50 TABLET ORAL NIGHTLY PRN
Qty: 30 TABLET | Refills: 0 | Status: SHIPPED | OUTPATIENT
Start: 2021-11-19

## 2021-11-19 RX ADMIN — DIPHENHYDRAMINE HCL 25 MG: 25 TABLET ORAL at 15:17

## 2021-11-19 RX ADMIN — DIPHENHYDRAMINE HCL 25 MG: 25 TABLET ORAL at 21:02

## 2021-11-19 RX ADMIN — HALOPERIDOL 2 MG: 1 TABLET ORAL at 15:13

## 2021-11-19 RX ADMIN — HYDROXYZINE HYDROCHLORIDE 50 MG: 50 TABLET, FILM COATED ORAL at 21:01

## 2021-11-19 RX ADMIN — HALOPERIDOL 2 MG: 1 TABLET ORAL at 21:00

## 2021-11-19 RX ADMIN — TRAZODONE HYDROCHLORIDE 50 MG: 50 TABLET ORAL at 21:01

## 2021-11-19 RX ADMIN — HALOPERIDOL 2 MG: 1 TABLET ORAL at 09:21

## 2021-11-19 NOTE — DISCHARGE INSTR - DIET

## 2021-11-19 NOTE — CARE COORDINATION
Discharge Arrangements: Pt to discharge home with [facility] outpatient appointment.      Guardian notified: not applicable   Discharge destination/address: [address] [place] [address]  Transported by: [address] [place] [address]

## 2021-11-19 NOTE — BH NOTE
Patient refused to attend 1860 coping skills Group after encouragement from staff. 1:1 talk time offered but patient refused.

## 2021-11-19 NOTE — PROGRESS NOTES
consciousness: Alert and awake. Appearance: Appropriate attire for setting, resting in bed, with fair  grooming and hygiene. Behavior/Motor: Approachable, no psychomotor abnormalities. Attitude toward examiner: Cooperative, attentive, fair eye contact. Speech: Normal rate, normal volume, normal tone. Mood:  Patient reports \"good\". Affect: Euthymic  Thought processes: Linear and coherent  Thought content: Denies homicidal ideation. Suicidal Ideation: Reports improvement in suicidal ideations, without current plan or intent, contracts for safety on the unit. Delusions: Patient presents with delusions. Reports improvement in paranoia. Perceptual Disturbance: Patient does not appear to be responding to internal stimuli. Reports improvement in auditory hallucinations. Reports improvement in visual hallucinations. Cognition: Oriented to self, location, time, and situation. Memory: Intact. Insight & Judgement: Poor. Data   height is 5' 8\" (1.727 m) and weight is 198 lb (89.8 kg). His temporal temperature is 96.9 °F (36.1 °C). His blood pressure is 116/78 and his pulse is 80. His respiration is 14. Labs:   No visits with results within 2 Day(s) from this visit.    Latest known visit with results is:   Admission on 04/22/2016, Discharged on 04/22/2016   Component Date Value Ref Range Status    Acetaminophen Level 04/22/2016 <10* 10 - 30 ug/mL Final    Comment: Performed at [facility] Emergency Dept and [facility], [address] [place] [address], [address] [place] [address]      Glucose 04/22/2016 95  70 - 99 mg/dL Final    BUN 04/22/2016 13  6 - 20 mg/dL Final    CREATININE 04/22/2016 1.10  0.70 - 1.20 mg/dL Final    Bun/Cre Ratio 04/22/2016 NOT REPORTED  9 - 20 Final    Calcium 04/22/2016 9.4  8.6 - 10.4 mg/dL Final    Sodium 04/22/2016 139  135 - 144 mmol/L Final    Potassium 04/22/2016 3.8  3.7 - 5.3 mmol/L Final    Chloride 04/22/2016 100  98 - 107 mmol/L Final    CO2 04/22/2016 24  20 - 31 mmol/L Final    Anion Gap 04/22/2016 15  9 - 17 mmol/L Final    GFR Non- 04/22/2016 >60  >60 mL/min Final    GFR  04/22/2016 >60  >60 mL/min Final    GFR Comment 04/22/2016        Final    Comment: Average GFR for [address] [address] years old:   80 mL/min/1.73sq m  Chronic Kidney Disease:   <60 mL/min/1.73sq m  Kidney failure:   <15 mL/min/1.73sq m        Performed at [facility] Emergency Dept and [facility], [facility], [address] [place] [address]      GFR Staging 04/22/2016 NOT REPORTED   Final    WBC 04/22/2016 12.9* 3.5 - 11.0 k/uL Final    RBC 04/22/2016 5.23  4.5 - 5.9 m/uL Final    Hemoglobin 04/22/2016 14.0  13.5 - 17.5 g/dL Final    Hematocrit 04/22/2016 44.1  41 - 53 % Final    MCV 04/22/2016 84.3  80 - 100 fL Final    MCH 04/22/2016 26.7  26 - 34 pg Final    MCHC 04/22/2016 31.7  31 - 37 g/dL Final    RDW 04/22/2016 14.1  12.5 - 15.4 % Final    Platelets 96/39/9217 182  140 - 450 k/uL Final    MPV 04/22/2016 9.9  6.0 - 12.0 fL Final    Differential Type 04/22/2016 NOT REPORTED   Final    WBC Morphology 04/22/2016 NOT REPORTED   Final    RBC Morphology 04/22/2016 NOT REPORTED   Final    Platelet Estimate 41/68/1006 NOT REPORTED   Final    Seg Neutrophils 04/22/2016 71* 36 - 66 % Final    Lymphocytes 04/22/2016 20* 24 - 44 % Final    Monocytes 04/22/2016 8  2 - 11 % Final    Eosinophils % 04/22/2016 0* 1 - 4 % Final    Basophils 04/22/2016 1  0 - 2 % Final    Segs Absolute 04/22/2016 9.20* 1.8 - 7.7 k/uL Final    Absolute Lymph # 04/22/2016 2.60  1.0 - 4.8 k/uL Final    Absolute Mono # 04/22/2016 1.10  0.1 - 1.2 k/uL Final    Absolute Eos # 04/22/2016 0.00  0.0 - 0.4 k/uL Final    Basophils Absolute 04/22/2016 0.10  0.0 - 0.2 k/uL Final    Comment: Performed at [facility] Emergency Dept and [facility], [facility], [address] [place] [address]      Ethanol 04/22/2016 <10  <10 mg/dL Final    Ethanol percent 04/22/2016 <0.010  % Final    Comment: Performed at [facility] Emergency Dept and [address] [place] [address], [address] [place] [address], [address] [place] [address]      Albumin 04/22/2016 4.5  3.5 - 5.2 g/dL Final    Alkaline Phosphatase 04/22/2016 53  40 - 129 U/L Final    ALT 04/22/2016 28  5 - 41 U/L Final    AST 04/22/2016 40* <40 U/L Final    Total Bilirubin 04/22/2016 0.60  0.3 - 1.2 mg/dL Final    Bilirubin, Direct 04/22/2016 0.15  <0.31 mg/dL Final    Bilirubin, Indirect 04/22/2016 0.45  0.00 - 1.00 mg/dL Final    Total Protein 04/22/2016 7.8  6.4 - 8.3 g/dL Final    Globulin 04/22/2016 NOT REPORTED  1.5 - 3.8 g/dL Final    Albumin/Globulin Ratio 04/22/2016 1.4  1.0 - 2.5 Final    Comment: Performed at [facility] Emergency Dept and [address] [place] [address], [address] [place] [address], [address] [place] [address]      Amphetamine Screen, Ur 04/22/2016 NEGATIVE  NEG Final    Comment:       (Positive cutoff 1000 ng/mL)                  Barbiturate Screen, Ur 04/22/2016 NEGATIVE  NEG Final    Comment:       (Positive cutoff 200 ng/mL)                  Benzodiazepine Screen, Urine 04/22/2016 POSITIVE* NEG Final    Comment:       (Positive cutoff 200 ng/mL)                  Cocaine Metabolite, Urine 04/22/2016 POSITIVE* NEG Final    Comment:       (Positive cutoff 300 ng/mL)                  Methadone Screen, Urine 04/22/2016 NEGATIVE  NEG Final    Comment:       (Positive cutoff 300 ng/mL)                  Opiates, Urine 04/22/2016 NEGATIVE  NEG Final    Comment:       (Positive cutoff 300 ng/mL)                  Phencyclidine, Urine 04/22/2016 NEGATIVE  NEG Final    Comment:       (Positive cutoff 25 ng/mL)                  Propoxyphene, Urine 04/22/2016 NOT REPORTED  NEG Final    Cannabinoid Scrn, Ur 04/22/2016 POSITIVE* NEG Final    Comment:       (Positive cutoff 50 ng/mL)                  Oxycodone Screen, Ur 04/22/2016 NEGATIVE  NEG Final    Comment:       (Positive cutoff 100 ng/mL)                  Methamphetamine, Urine 04/22/2016 NOT REPORTED  NEG Final    Tricyclic Antidepressants, Urine 04/22/2016 NOT REPORTED  NEG Final    MDMA, Urine 04/22/2016 NOT REPORTED  NEG Final    Buprenorphine Urine 04/22/2016 NOT REPORTED  NEG Final    Test Information 04/22/2016 Assay provides medical screening only. The absence of expected drug(s) and/or   Final    Comment:  metabolite(s) may indicate diluted or adulterated urine, limitations of testing   or timing of collection. Testing for legal purposes should be confirmed by another method. To request   confirmation of test result, please call the lab within 7 days of sample   submission. Performed at [facility] Emergency Dept and [address] [place] [address]      Salicylate Lvl 63/06/7944 <1* 3 - 10 mg/dL Final    Comment: Performed at [facility] Emergency Dept and [address] [place] [address]      Ventricular Rate 04/22/2016 87  BPM Final    Atrial Rate 04/22/2016 87  BPM Final    P-R Interval 04/22/2016 124  ms Final    QRS Duration 04/22/2016 88  ms Final    Q-T Interval 04/22/2016 370  ms Final    QTc Calculation (Bazett) 04/22/2016 445  ms Final    P Axis 04/22/2016 52  degrees Final    R Axis 04/22/2016 22  degrees Final    T Axis 04/22/2016 25  degrees Final         Reviewed patient's current plan of care and vital signs with nursing staff.     Labs reviewed: [x] Yes in physical chart      Medications  Current Facility-Administered Medications: diphenhydrAMINE (BENADRYL) tablet 25 mg, 25 mg, Oral, Q8H PRN  haloperidol (HALDOL) tablet 2 mg, 2 mg, Oral, TID  LORazepam (ATIVAN) tablet 2 mg, 2 mg, Oral, Q4H PRN **AND** haloperidol (HALDOL) tablet 5 mg, 5 mg, Oral, Q4H PRN  LORazepam (ATIVAN) injection 2 mg, 2 mg, IntraMUSCular, Q4H PRN **AND** haloperidol lactate (HALDOL) injection 5 mg, 5 mg, IntraMUSCular, Q4H PRN **AND** diphenhydrAMINE (BENADRYL) injection 50 mg, 50 mg, IntraMUSCular, Q4H PRN  acetaminophen (TYLENOL) tablet 650 mg, 650 mg, Oral, Q4H PRN  aluminum & magnesium hydroxide-simethicone (MAALOX) 200-200-20 MG/5ML suspension 30 mL, 30 mL, Oral, Q6H PRN  hydrOXYzine (ATARAX) tablet 50 mg, 50 mg, Oral, TID PRN  ibuprofen (ADVIL;MOTRIN) tablet 400 mg, 400 mg, Oral, Q6H PRN  polyethylene glycol (GLYCOLAX) packet 17 g, 17 g, Oral, Daily PRN  traZODone (DESYREL) tablet 50 mg, 50 mg, Oral, Nightly PRN  nicotine (NICODERM CQ) 14 MG/24HR 1 patch, 1 patch, TransDERmal, Daily  influenza quadrivalent split vaccine (FLUZONE;FLUARIX;FLULAVAL;AFLURIA) injection 0.5 mL, 0.5 mL, IntraMUSCular, Prior to discharge    ASSESSMENT  Major depressive disorder, recurrent, severe with psychotic features ([facility] [address]         PLAN  Patient symptoms are: Modestly Improving. Continue current medication regimen. Patient received Haldol decanoate injection 11/18  Monitor need and frequency of PRN medications. Encourage participation in groups and milieu. Attempt to develop insight. Psycho-education conducted. Supportive Therapy conducted. Probable discharge is to be determined by MD.   Follow-up daily while inpatient. Patient continues to be monitored in the inpatient psychiatric facility at [facility] for safety and stabilization. Patient continues to need, on a daily basis, active treatment furnished directly by or requiring the supervision of inpatient psychiatric personnel. Electronically signed by Ritchie De Anda, MAGY Philippe CNP on 11/19/2021 at 3:20 PM    **This report has been created using voice recognition software. It may contain minor errors which are inherent in voice recognition technology. **      I independently saw and evaluated the patient. I reviewed the nurse practitioners documentation above. Any additional comments or changes to the nurse practitioners documentation are stated below otherwise agree with assessment. Plan will be as follows:  Spent 30 minutes with the patient and his girlfriend of 13 years and mother of his children.   Of that time greater than 16 minutes was spent in supportive psychotherapy and education. Patient is reporting improvement in symptoms. Denying side effects to long-acting injectable of Haldol. We discussed continuing the oral Haldol for 1 week after discharge and following up with likely ability to discontinue oral Haldol and continue with long-acting injectable. His wife is endorsing that he is doing better. Patient has allowed us to talk to family. We discussed the importance of therapy as well as couples therapy. Identified triggers and stressors in the household. His wife (girlfriend but they have been together for 15 years in [facility] states they are common law) reporting feeling safe with him coming home. All 6 safety concerns explored and there were none identified. Patient denying suicidal or homicidal ideation. Discussed if stable through tomorrow would consider discharge and patient is in agreement  PLAN  Patient s symptoms   are improving  Continue with current medication for now  Attempt to develop insight  Psycho-education conducted. Supportive Therapy conducted.   Probable discharge is tomorrow if stable or improved  Follow-up daily while on inpatient unit

## 2021-11-19 NOTE — PLAN OF CARE
Problem: Altered Mood, Depressive Behavior:  Goal: Able to verbalize and/or display a decrease in depressive symptoms  Description: Able to verbalize and/or display a decrease in depressive symptoms  Outcome: Ongoing  Patient states that they feel slightly depressed today. Patient reports a decrease in depressive symptoms since admission. Patient is out in dayroom socializing and attending groups. Improvement in symptoms shown. Q15 minute checks in place for patient safety. Problem: Altered Mood, Depressive Behavior:  Goal: Ability to disclose and discuss suicidal ideas will improve  Description: Ability to disclose and discuss suicidal ideas will improve  Outcome: Ongoing  Patient denies any suicidal ideas at this time. Patient was recently suicidal but states that these thoughts have improved. Patient contracts for safety. If thoughts of suicidal arise, patient will contact a staff member immediately. Q15 minute checks in place for patient safety. Patient remains safe at this time. Problem: Altered Mood, Depressive Behavior:  Goal: Absence of self-harm  Description: Absence of self-harm  Outcome: Ongoing  Patient has remained free from self-harm during admission. Patient does not feel the need to harm themselves. If these types of thoughts arise, patient will notify a staff member. Q15 Minute checks in place for patient safety. Problem: Tobacco Use:  Goal: Inpatient tobacco use cessation counseling participation  Description: Inpatient tobacco use cessation counseling participation  Outcome: Ongoing  Patient is not interested in tobacco cessation at this time. Problem: Pain:  Goal: Control of chronic pain  Description: Control of chronic pain  Outcome: Ongoing  Patient denies any pain.

## 2021-11-19 NOTE — GROUP NOTE
Group Therapy Note    Date: 11/19/2021    Group Start Time: 1100  Group End Time: 1200  Group Topic: Recreational    ST[facility] SURESH Rees, [facility]        Group Therapy Note    Attendees: 7/16    patient refused to attend creative expression group at [phone] after encouragement from staff. 1:1 talk time provided as alternative to group session.

## 2021-11-19 NOTE — GROUP NOTE
Group Therapy Note    Date: 11/19/2021    Group Start Time: 1000  Group End Time: 1100  Group Topic: Psychotherapy    [facility]    SHANTI Urbina, [facility]        Group Therapy Note    Patient declined to attend psychotherapy group at 10 am despite encouragement by staff. 1:1 was offered as an alternative.             Signature:  SHANTI Urbina, [place]

## 2021-11-19 NOTE — PROGRESS NOTES
CLINICAL PHARMACY NOTE: MEDS TO BEDS    Total # of Prescriptions Filled: 3   The following medications were delivered to the patient:  · Haloperidol 2mg  · Trazodone HCL 50mg  · Hydroxyzine HCL 50mg    Additional Documentation:    Delivered medication to nurses station

## 2021-11-20 VITALS
BODY MASS INDEX: 30.01 KG/M2 | TEMPERATURE: 97.1 F | RESPIRATION RATE: 14 BRPM | DIASTOLIC BLOOD PRESSURE: 73 MMHG | WEIGHT: 198 LBS | HEART RATE: 99 BPM | SYSTOLIC BLOOD PRESSURE: 107 MMHG | HEIGHT: 68 IN

## 2021-11-20 PROCEDURE — 6370000000 HC RX 637 (ALT 250 FOR IP): Performed by: PSYCHIATRY & NEUROLOGY

## 2021-11-20 PROCEDURE — 99239 HOSP IP/OBS DSCHRG MGMT >30: CPT | Performed by: PSYCHIATRY & NEUROLOGY

## 2021-11-20 RX ADMIN — HALOPERIDOL 2 MG: 1 TABLET ORAL at 08:41

## 2021-11-20 ASSESSMENT — PAIN SCALES - GENERAL: PAINLEVEL_OUTOF10: 0

## 2021-11-20 NOTE — GROUP NOTE
Group Therapy Note    Date: 11/20/2021    Group Start Time: 0900  Group End Time: 0930  Group Topic: Community Meeting    [facility]    Patient refused to attend community meeting and goal setting group at 0900 after encouragement from staff. 1:1 talk time offered by staff as alternative to group session.

## 2021-11-20 NOTE — BH NOTE
[facility]  Discharge Note    Patient was discharged home and picked up at the main entrance by family members. Patient left with all of his medications, discharge instructions, and belongings. Patient was calm and cooperative throughout the admission process. Patient was stable before leaving the unit. Patient was wearing a mask. Pt discharged with followings belongings:   Dentures: None  Vision - Corrective Lenses: None  Hearing Aid: None  Jewelry: None  Clothing: Footwear, Pants, Shirt, Sweater, Socks  Were All Patient Medications Collected?: Not Applicable  Other Valuables: Cell phone   Valuables sent home with Patient or returned to patient. Patient education on aftercare instructions: Yes  Information faxed to [facility] by Nurse  at 11:43 AM .Patient verbalize understanding of AVS:  Yes.     Status EXAM upon discharge:  Status and Exam  Normal: No  Facial Expression: Avoids Gaze, Worried  Affect: Appropriate  Level of Consciousness: Alert  Mood:Normal: No  Mood: Anxious, Suspicious  Motor Activity:Normal: Yes  Motor Activity: Decreased  Interview Behavior: Cooperative  Preception: [place] to Person, [place] to Time, [place] to Place, [place] to Situation  Attention:Normal: No  Attention: Distractible  Thought Processes: Flt.of Ideas  Thought Content:Normal: No  Thought Content: Preoccupations  Hallucinations: None  Delusions: No  Delusions: Persecution  Memory:Normal: No  Memory: Poor Remote  Insight and Judgment: No  Insight and Judgment: Poor Insight  Present Suicidal Ideation: No  Present Homicidal Ideation: No      Metabolic Screening:    No results found for: LABA1C    No results found for: CHOL  No results found for: TRIG  No results found for: HDL  No components found for: LDLCAL  No results found for: Sissy Abel RN

## 2021-11-20 NOTE — PLAN OF CARE
Problem: Altered Mood, Depressive Behavior:  Goal: Able to verbalize and/or display a decrease in depressive symptoms  Description: Able to verbalize and/or display a decrease in depressive symptoms  Outcome: Ongoing     Problem: Altered Mood, Depressive Behavior:  Goal: Ability to disclose and discuss suicidal ideas will improve  Description: Ability to disclose and discuss suicidal ideas will improve  Outcome: Ongoing     Problem: Altered Mood, Depressive Behavior:  Goal: Absence of self-harm  Description: Absence of self-harm  Outcome: Ongoing    Patient is brightened and social with peers, pleasant with staff. Pt reports improved mood and readiness for discharge. Pt is medication compliant and attended group. Pt denies thoughts of self harm and is agreeable to seeking out should thoughts of self harm arise. Safe environment maintained. Q15 minute checks for safety cont per unit policy. Will cont to monitor for safety and provide support and reassurance as needed.

## 2021-11-20 NOTE — BH NOTE
Patient given tobacco quitline number 96052388611 at this time, refusing to call at this time, states \" I just dont want to quit now\"- patient given information as to the dangers of long term tobacco use. Continue to reinforce the importance of tobacco cessation.

## 2021-11-22 NOTE — CARE COORDINATION
Name: Amol Chaparro    : 1975    Discharge Date: 21    Primary Auth/Cert #: [id number]    Destination: home     Discharge Medications:      Medication List      START taking these medications    haloperidol 2 MG tablet  Commonly known as: HALDOL  Take 1 tablet by mouth 3 times daily  Notes to patient: To help with anxiety and stabilize mood      hydrOXYzine 50 MG tablet  Commonly known as: ATARAX  Take 1 tablet by mouth 3 times daily as needed for Anxiety  Notes to patient: To help with anxiety     traZODone 50 MG tablet  Commonly known as: DESYREL  Take 1 tablet by mouth nightly as needed for Sleep  Notes to patient: To help with sleep        STOP taking these medications    asenapine maleate 10 MG Subl sublingual tablet  Commonly known as: SAPHRIS     benztropine 1 MG tablet  Commonly known as: COGENTIN     oxyCODONE-acetaminophen 7.5-325 MG per tablet  Commonly known as: PERCOCET     prazosin 1 MG capsule  Commonly known as: MINIPRESS     zolpidem 12.5 MG extended release tablet  Commonly known as: AMBIEN NORA           Where to Get Your Medications      These medications were sent to [address] [place] [address]    Phone: [phone]   · haloperidol 2 MG tablet  · hydrOXYzine 50 MG tablet  · traZODone 50 MG tablet         Follow Up Appointment: [place] [address] [address] [place] [id number] [phone]    On 2021  Appointment with Dr. Tatiana Herrera @2:05PM    Discharge home via [facility] [place] [facility]:  [address] [place] [address]  On 2021
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
## 2021-12-22 ENCOUNTER — HOSPITAL ENCOUNTER (EMERGENCY)
Age: 46
Discharge: HOME | End: 2021-12-22
Attending: EMERGENCY MEDICINE
Payer: MEDICARE

## 2021-12-22 ENCOUNTER — APPOINTMENT (OUTPATIENT)
Dept: GENERAL RADIOLOGY | Age: 46
End: 2021-12-22
Payer: MEDICARE

## 2021-12-22 VITALS
TEMPERATURE: 98.1 F | BODY MASS INDEX: 30.31 KG/M2 | OXYGEN SATURATION: 97 % | HEART RATE: 83 BPM | DIASTOLIC BLOOD PRESSURE: 90 MMHG | RESPIRATION RATE: 18 BRPM | SYSTOLIC BLOOD PRESSURE: 122 MMHG | HEIGHT: 68 IN | WEIGHT: 200 LBS

## 2021-12-22 DIAGNOSIS — M25.561 ACUTE PAIN OF RIGHT KNEE: Primary | ICD-10-CM

## 2021-12-22 PROCEDURE — 73562 X-RAY EXAM OF KNEE 3: CPT

## 2021-12-22 PROCEDURE — 99284 EMERGENCY DEPT VISIT MOD MDM: CPT

## 2021-12-22 PROCEDURE — 6370000000 HC RX 637 (ALT 250 FOR IP): Performed by: EMERGENCY MEDICINE

## 2021-12-22 RX ORDER — ACETAMINOPHEN AND CODEINE PHOSPHATE 300; 30 MG/1; MG/1
1 TABLET ORAL ONCE
Status: COMPLETED | OUTPATIENT
Start: 2021-12-22 | End: 2021-12-22

## 2021-12-22 RX ORDER — ACETAMINOPHEN AND CODEINE PHOSPHATE 300; 30 MG/1; MG/1
1 TABLET ORAL EVERY 6 HOURS PRN
Qty: 20 TABLET | Refills: 0 | Status: SHIPPED | OUTPATIENT
Start: 2021-12-22 | End: 2021-12-27

## 2021-12-22 RX ORDER — IBUPROFEN 800 MG/1
800 TABLET ORAL EVERY 8 HOURS PRN
Qty: 20 TABLET | Refills: 0 | Status: SHIPPED | OUTPATIENT
Start: 2021-12-22 | End: 2022-02-22

## 2021-12-22 RX ADMIN — ACETAMINOPHEN AND CODEINE PHOSPHATE 1 TABLET: 300; 30 TABLET ORAL at 21:41

## 2021-12-22 ASSESSMENT — ENCOUNTER SYMPTOMS
CONSTIPATION: 0
EYE REDNESS: 0
COUGH: 0
DIARRHEA: 0
FACIAL SWELLING: 0
EYE DISCHARGE: 0
COLOR CHANGE: 0
ABDOMINAL PAIN: 0
SHORTNESS OF BREATH: 0
VOMITING: 0

## 2021-12-22 ASSESSMENT — PAIN SCALES - GENERAL
PAINLEVEL_OUTOF10: 10
PAINLEVEL_OUTOF10: 9
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
## 2022-01-07 ENCOUNTER — OFFICE VISIT (OUTPATIENT)
Dept: ORTHOPEDIC SURGERY | Age: 47
End: 2022-01-07
Payer: MEDICARE

## 2022-01-07 VITALS
WEIGHT: 210 LBS | HEIGHT: 68 IN | HEART RATE: 78 BPM | RESPIRATION RATE: 16 BRPM | BODY MASS INDEX: 31.83 KG/M2 | DIASTOLIC BLOOD PRESSURE: 77 MMHG | SYSTOLIC BLOOD PRESSURE: 113 MMHG

## 2022-01-07 DIAGNOSIS — S83.249A ACUTE MEDIAL MENISCAL TEAR, INITIAL ENCOUNTER: Primary | ICD-10-CM

## 2022-01-07 PROCEDURE — 4004F PT TOBACCO SCREEN RCVD TLK: CPT | Performed by: PHYSICIAN ASSISTANT

## 2022-01-07 PROCEDURE — 99203 OFFICE O/P NEW LOW 30 MIN: CPT | Performed by: PHYSICIAN ASSISTANT

## 2022-01-07 PROCEDURE — G8417 CALC BMI ABV UP PARAM F/U: HCPCS | Performed by: PHYSICIAN ASSISTANT

## 2022-01-07 PROCEDURE — G8427 DOCREV CUR MEDS BY ELIG CLIN: HCPCS | Performed by: PHYSICIAN ASSISTANT

## 2022-01-07 PROCEDURE — G8484 FLU IMMUNIZE NO ADMIN: HCPCS | Performed by: PHYSICIAN ASSISTANT

## 2022-01-07 ASSESSMENT — ENCOUNTER SYMPTOMS
RESPIRATORY NEGATIVE: 1
COUGH: 0
SHORTNESS OF BREATH: 0
COLOR CHANGE: 0
VOMITING: 0
ABDOMINAL PAIN: 0
ABDOMINAL DISTENTION: 0
DIARRHEA: 0
CONSTIPATION: 0
NAUSEA: 0
CHEST TIGHTNESS: 0
APNEA: 0
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
## 2022-01-19 ENCOUNTER — HOSPITAL ENCOUNTER (OUTPATIENT)
Dept: MRI IMAGING | Facility: CLINIC | Age: 47
Discharge: HOME | End: 2022-01-21
Payer: MEDICARE

## 2022-01-19 DIAGNOSIS — S83.249A ACUTE MEDIAL MENISCAL TEAR, INITIAL ENCOUNTER: ICD-10-CM

## 2022-01-19 PROCEDURE — 73721 MRI JNT OF LWR EXTRE W/O DYE: CPT

## 2022-01-26 ENCOUNTER — OFFICE VISIT (OUTPATIENT)
Dept: ORTHOPEDIC SURGERY | Age: 47
End: 2022-01-26
Payer: MEDICARE

## 2022-01-26 VITALS — HEIGHT: 68 IN | WEIGHT: 211 LBS | BODY MASS INDEX: 31.98 KG/M2

## 2022-01-26 DIAGNOSIS — M22.41 CHONDROMALACIA, PATELLA, RIGHT: ICD-10-CM

## 2022-01-26 DIAGNOSIS — S83.249A ACUTE MEDIAL MENISCAL TEAR, INITIAL ENCOUNTER: Primary | ICD-10-CM

## 2022-01-26 PROCEDURE — 99213 OFFICE O/P EST LOW 20 MIN: CPT | Performed by: ORTHOPAEDIC SURGERY

## 2022-01-26 PROCEDURE — 4004F PT TOBACCO SCREEN RCVD TLK: CPT | Performed by: ORTHOPAEDIC SURGERY

## 2022-01-26 PROCEDURE — G8484 FLU IMMUNIZE NO ADMIN: HCPCS | Performed by: ORTHOPAEDIC SURGERY

## 2022-01-26 PROCEDURE — G8428 CUR MEDS NOT DOCUMENT: HCPCS | Performed by: ORTHOPAEDIC SURGERY

## 2022-01-26 PROCEDURE — G8417 CALC BMI ABV UP PARAM F/U: HCPCS | Performed by: ORTHOPAEDIC SURGERY

## 2022-01-26 RX ORDER — OXYCODONE HYDROCHLORIDE AND ACETAMINOPHEN 5; 325 MG/1; MG/1
TABLET ORAL
Status: ON HOLD | COMMUNITY
End: 2022-03-15 | Stop reason: HOSPADM

## 2022-01-26 ASSESSMENT — ENCOUNTER SYMPTOMS
COUGH: 0
VOMITING: 0
APNEA: 0
CHEST TIGHTNESS: 0
ABDOMINAL PAIN: 0

## 2022-01-26 NOTE — PROGRESS NOTES
[address] [place] [address] AND SPORTS MEDICINE  [facility]  [address] [facility] [address]  Dept: [phone]  Dept Fax: [phone]        Ambulatory Follow Up      Subjective:   Roland Ratliff is a 51y.o. year old male who presents to our office today for routine followup regarding his   1. Acute medial meniscal tear, subsequent encounter    2. Chondromalacia, patella, right    . Chief Complaint   Patient presents with    Follow-up     Right Knee MRI Review       HPI- Patient is in the office today in follow up for right knee pain after a popping sensation to the right knee on 12/21/21 patient mentions that his PCP had prescribed him Narcotics for his pain. He prescribed him a one week prescription only. He feels like his pain is relieved with the medication. He reports locking and buckling of the right knee often and the locking is painful. The patient still has trouble with stairs. The patient feels like with time he has not seen improvement with his pain and would like to discuss possible surgery for the right knee. Patient had a left knee arthroscopy in 2015 and did well until his stair incident in 2021. Patient had an MRI of the right knee on 1/19/2022. Review of Systems   Constitutional: Negative for chills and fever. Respiratory: Negative for apnea, cough and chest tightness. Cardiovascular: Negative for chest pain and palpitations. Gastrointestinal: Negative for abdominal pain and vomiting. Genitourinary: Negative for difficulty urinating. Musculoskeletal: Positive for arthralgias (right knee). Negative for gait problem, joint swelling and myalgias. Neurological: Negative for dizziness, weakness and numbness. I have reviewed the CC, HPI, ROS, PMH, FHX, Social History, and if not present in this note, I have reviewed in the patient's chart.    I agree with the documentation provided by other staff and have reviewed their documentation prior to providing my signature indicating agreement. Objective :   Ht 5' 8\" (1.727 m)   Wt 211 lb (95.7 kg)   BMI 32.08 kg/m²  Body mass index is 32.08 kg/m². General: Kenny Pace is a 51 y.o. male who is alert and oriented and sitting comfortably in our office. Ortho Exam  MS:  Evaluation of the Right knee shows no erythema, warmth, skin lesions, signs of infection. mild Knee effusion is appreciated. Patient has full range of motion of the knee. Tenderness over the medial joint line is appreciated. Patient has a negative patellar grind sign and a negative patellar apprehension sign. There is no instability with varus and valgus stress applied at 0 and 30° of flexion. A negative anterior drawer and Lachman's test is appreciated. There is increased pain with a medial and lateral Brenda's test but no palpable click. There is no calf tenderness. There is a negative hip log-roll and Stinchfield test.  Motor, sensory, vascular examination to the Right lower extremity is intact without focal deficits. Neuro: alert and oriented to person and place. Eyes: Extra-ocular muscles intact  Mouth: Oral mucosa moist. No perioral lesions  Pulm: Respirations unlabored and regular. Symmetric chest excursion without outward deformity is noted. Skin: warm, well perfused  Psych:   Patient has good fund of knowledge and displays understanging of exam, diagnosis, and plan. Radiology:   Narrative   EXAMINATION:   MRI OF THE RIGHT KNEE WITHOUT CONTRAST, 1/19/2022 3:00 pm       TECHNIQUE:   Multiplanar multisequence MRI of the right knee was performed without the   administration of intravenous contrast.       COMPARISON:   05/12/2015       HISTORY:   ORDERING SYSTEM PROVIDED HISTORY: Acute medial meniscal tear, initial   encounter   TECHNOLOGIST PROVIDED HISTORY:   Medial meniscal tear vs bone edema   Reason for Exam: ACUTE PAIN IN RIGHT KNEE. LIMITED ROM. STIFFNESS. CLICKING   NOISES. HX OF INJURY & KNEE SCOPE X4 YRS AGO.       24-year-old male with acute right knee pain and limited range of motion       FINDINGS:   MENISCI: Lateral meniscus demonstrates normal morphology and signal   characteristics.  No lateral meniscus tear.       Oblique undersurface tear in the posterior horn-body junction of the medial   meniscus on image 6, series 11 and image 17, series 8.       CRUCIATE LIGAMENTS: Anterior and posterior cruciate ligaments appear intact.       EXTENSOR MECHANISM: Mild superior patellar tendinosis.  Distal quadriceps   tendon and patellar retinacula appear intact.       LATERAL COLLATERAL LIGAMENT COMPLEX: Popliteus muscle/tendon, iliotibial   band, lateral collateral ligament, and biceps femoris appear intact.       MEDIAL COLLATERAL LIGAMENT COMPLEX: Medial collateral ligament complex   appears intact.       KNEE JOINT: No sizable joint effusion.       Osseous alignment is normal.       No acute fracture or dislocation.       Grade 2 lateral compartment chondromalacia.       Grade 2 chondromalacia of the medial compartment with full-thickness fissure   at the inner weight-bearing medial femoral condyle on image 15, series 8.       Grade 2-3 patellofemoral chondromalacia with partial and full-thickness   fissuring at the patellar apex and medial patellar facet as well as the   central femoral trochlea.       BONE MARROW: Bone marrow signal intensity within the visualized osseous   structures is within normal limits.       SOFT TISSUES: Lobulated high STIR/T2 signal likely representing ganglion cyst   formation in association with the popliteus musculotendinous junction on   image 31, series 6 and image 21, series 8 measuring 2.9 x 2.4 x 1.7 cm on   image 21, series 8 and image 31, series 6.  Visualized popliteal   neurovascular bundle grossly unremarkable.  No sizable Baker's cyst.           Impression   1.  Oblique undersurface tear in the posterior horn-body junction of the   medial meniscus. 2. Ganglion cyst formation at the popliteus musculotendinous junction   measuring up to 2.9 cm.   3. Mild-to-moderate patellofemoral chondromalacia with partial and   full-thickness fissuring at the patellar apex and medial patellar facet as   well as the central femoral trochlea.  Mild lateral and medial compartment   chondromalacia with full-thickness fissure at the inner weight-bearing medial   femoral condyle. 4. Mild superior patellar tendinosis.             Assessment:      1. Acute medial meniscal tear, subsequent encounter    2. Chondromalacia, patella, right       Plan:      Reviewed MRI results of the right knee with the patient. Discussed etiology and natural history of right knee medial meniscus tear and right knee arthritis. The treatment options may include oral anti-inflammatories, bracing, injections, advanced imaging, activity modification, physical therapy and/or surgical intervention. The patient would like to proceed with right knee arthroscopy. The patient will follow up in the office 2 weeks after surgery. We discussed that the patient should call us with any concerns or questions. Follow up:Return for Two weeks postoperatively. No orders of the defined types were placed in this encounter. No orders of the defined types were placed in this encounter. Theodore Saucedo LPN am scribing for and in the presence of Dr. Jaclyn Weston  1/28/2022 9:19 AM    I have reviewed and made changes accordingly to the work scribed by Katya Portillo LPN. The documentation accurately reflects work and decisions made by me. I have also reviewed documentation completed by clinical staff.     Jaclyn Weston DO, [address] [facility] Sports Medicine  1/28/2022 9:19 AM    This note is created with the assistance of a speech recognition program.  While intending to generate a document that actually reflects the content of the visit, the document can still have some errors including those of syntax and sound a like substitutions which may escape proof reading.  In such instances, actual meaning can be extrapolated by contextual diversion      All details of the procedure, as well as risks, benefits and alternatives, including the option of non operative versus operative treatment were discussed. The patient understands that risks of the surgery include but are not limited to: bleeding, malunion/nonunion, loss of fixation, loss of reduction, hardware failure, angular or rotational deformity, length discrepancy, limp, transfusion, skin blistering or breakdown, progressive post traumatic degenerative joint disease, possible need for further surgery, bone grafting, infection, nerve injury, paralysis, numbness, blood vessel injury, excessive scaring, wound complication or breakdown, failure of symptoms to improve or actual deterioration in condition, significant acute and/or chronic pain, possible need for amputation, permanent loss of motion, and permanent loss of function. As well as the general complications of anesthesia, which include but are not limited to: myocardial infarction and/or heart attack, stroke, multi organ system failure or even possible death, prolonged hospital stay, blood clots, pulmonary embolism, abnormal reaction to medication, visual and neurological disturbances, constipation, ischemic bowel, bowel obstruction, bowel perforation, ileus and mental status changes. No guarantees were made.       Electronically signed by Radha Daly DO, FAOAO on 1/28/2022 at 9:19 AM

## 2022-02-15 ENCOUNTER — TELEPHONE (OUTPATIENT)
Dept: ORTHOPEDIC SURGERY | Age: 47
End: 2022-02-15

## 2022-02-15 DIAGNOSIS — Z01.818 PRE-OP TESTING: Primary | ICD-10-CM

## 2022-02-22 ENCOUNTER — HOSPITAL ENCOUNTER (OUTPATIENT)
Dept: PREADMISSION TESTING | Age: 47
Discharge: HOME | End: 2022-02-26
Payer: MEDICARE

## 2022-02-22 VITALS
HEIGHT: 68 IN | WEIGHT: 208 LBS | DIASTOLIC BLOOD PRESSURE: 90 MMHG | HEART RATE: 98 BPM | RESPIRATION RATE: 16 BRPM | OXYGEN SATURATION: 99 % | SYSTOLIC BLOOD PRESSURE: 121 MMHG | TEMPERATURE: 97.7 F | BODY MASS INDEX: 31.52 KG/M2

## 2022-02-22 DIAGNOSIS — Z01.818 PRE-OP TESTING: ICD-10-CM

## 2022-02-22 LAB
-: ABNORMAL
ALBUMIN SERPL-MCNC: 4.7 G/DL (ref 3.5–5.2)
ALP BLD-CCNC: 76 U/L (ref 40–129)
ALT SERPL-CCNC: 17 U/L (ref 5–41)
ANION GAP SERPL CALCULATED.3IONS-SCNC: 13 MMOL/L (ref 9–17)
AST SERPL-CCNC: 16 U/L
BILIRUB SERPL-MCNC: 0.28 MG/DL (ref 0.3–1.2)
BILIRUBIN URINE: NEGATIVE
BUN BLDV-MCNC: 12 MG/DL (ref 6–20)
BUN/CREAT BLD: 10 (ref 9–20)
CALCIUM SERPL-MCNC: 9.9 MG/DL (ref 8.6–10.4)
CHLORIDE BLD-SCNC: 104 MMOL/L (ref 98–107)
CO2: 24 MMOL/L (ref 20–31)
COLOR: YELLOW
CREAT SERPL-MCNC: 1.18 MG/DL (ref 0.7–1.2)
EPITHELIAL CELLS UA: ABNORMAL /HPF (ref 0–5)
GFR AFRICAN AMERICAN: >60 ML/MIN
GFR NON-AFRICAN AMERICAN: >60 ML/MIN
GFR SERPL CREATININE-BSD FRML MDRD: ABNORMAL ML/MIN/{1.73_M2}
GLUCOSE BLD-MCNC: 89 MG/DL (ref 70–99)
GLUCOSE URINE: NEGATIVE
HCT VFR BLD CALC: 43.8 % (ref 40.7–50.3)
HEMOGLOBIN: 14.2 G/DL (ref 13–17)
KETONES, URINE: NEGATIVE
LEUKOCYTE ESTERASE, URINE: NEGATIVE
MCH RBC QN AUTO: 27 PG (ref 25.2–33.5)
MCHC RBC AUTO-ENTMCNC: 32.4 G/DL (ref 28.4–34.8)
MCV RBC AUTO: 83.3 FL (ref 82.6–102.9)
MUCUS: ABNORMAL
NITRITE, URINE: NEGATIVE
NRBC AUTOMATED: 0 PER 100 WBC
PDW BLD-RTO: 14 % (ref 11.8–14.4)
PH UA: 6 (ref 5–8)
PLATELET # BLD: 214 K/UL (ref 138–453)
PMV BLD AUTO: 11.4 FL (ref 8.1–13.5)
POTASSIUM SERPL-SCNC: 4.4 MMOL/L (ref 3.7–5.3)
PROTEIN UA: NEGATIVE
RBC # BLD: 5.26 M/UL (ref 4.21–5.77)
RBC UA: ABNORMAL /HPF (ref 0–2)
SODIUM BLD-SCNC: 141 MMOL/L (ref 135–144)
SPECIFIC GRAVITY UA: 1.03 (ref 1–1.03)
TOTAL PROTEIN: 7.9 G/DL (ref 6.4–8.3)
TURBIDITY: ABNORMAL
URINE HGB: ABNORMAL
UROBILINOGEN, URINE: NORMAL
WBC # BLD: 8.9 K/UL (ref 3.5–11.3)
WBC UA: ABNORMAL /HPF (ref 0–5)

## 2022-02-22 PROCEDURE — 80053 COMPREHEN METABOLIC PANEL: CPT

## 2022-02-22 PROCEDURE — 81001 URINALYSIS AUTO W/SCOPE: CPT

## 2022-02-22 PROCEDURE — 93005 ELECTROCARDIOGRAM TRACING: CPT

## 2022-02-22 PROCEDURE — 36415 COLL VENOUS BLD VENIPUNCTURE: CPT

## 2022-02-22 PROCEDURE — 85027 COMPLETE CBC AUTOMATED: CPT

## 2022-02-22 RX ORDER — HALOPERIDOL DECANOATE 100 MG/ML
100 INJECTION INTRAMUSCULAR
COMMUNITY
Start: 2022-02-21

## 2022-02-22 RX ORDER — BENZTROPINE MESYLATE 0.5 MG/1
0.5 TABLET ORAL NIGHTLY
COMMUNITY
Start: 2022-02-19

## 2022-02-22 RX ORDER — ALBUTEROL SULFATE 90 UG/1
2 AEROSOL, METERED RESPIRATORY (INHALATION) DAILY PRN
COMMUNITY

## 2022-02-22 ASSESSMENT — PAIN DESCRIPTION - PAIN TYPE: TYPE: CHRONIC PAIN

## 2022-02-22 ASSESSMENT — PAIN DESCRIPTION - LOCATION: LOCATION: KNEE

## 2022-02-22 ASSESSMENT — PAIN SCALES - GENERAL: PAINLEVEL_OUTOF10: 6

## 2022-02-22 ASSESSMENT — PAIN DESCRIPTION - DESCRIPTORS: DESCRIPTORS: THROBBING

## 2022-02-22 ASSESSMENT — PAIN DESCRIPTION - ORIENTATION: ORIENTATION: RIGHT

## 2022-02-22 ASSESSMENT — PAIN DESCRIPTION - FREQUENCY: FREQUENCY: INTERMITTENT

## 2022-02-22 NOTE — PRE-PROCEDURE INSTRUCTIONS
ARRIVE  Ward Carlos Tuesday, March 15 Dr. Maxi Chamberlain office will call you with time      Once you enter the hospital lobby, take the elevators to the second floor. Check-In is at the surgery registration desk. Continue to take your home medications as you normally do up to and including the night before surgery with the exception of any blood thinning medications. Please stop any blood thinning medications as directed by your surgeon or prescribing physician. Failure to stop certain medications may interfere with your scheduled surgery. These may include:  Aspirin, Warfarin (Coumadin), Clopidogrel (Plavix), Ibuprofen (Motrin, Advil), Naproxen (Aleve), Meloxicam (Mobic), Celecoxib (Celebrex), Eliquis, Pradaxa, Xarelto, Effient, Fish Oil, Herbal supplements. Stop 5-10 days prior to surgery Dr. Maxi Chamberlain office will let you know         Please take the following medication(s) the day of surgery with a small sip of water:  Not needed     Please use your inhaler(s) if needed and bring your inhaler(s) from home the day of surgery. PREPARING FOR YOUR SURGERY:     Before surgery, you can play an important role in your own health. Because skin is not sterile, we need to be sure that your skin is as free of germs as possible before surgery by carefully washing before surgery. Preparing or prepping skin before surgery can reduce the risk of a surgical site infection.   Do not shave the area of your body where your surgery will be performed unless you received specific permission from your physician. You will need to shower at home the night before surgery and the morning of surgery with a special soap called chlorhexidine gluconate (CHG*). *Not to be used by people allergic to Chlorhexidine Gluconate (CHG). Following these instructions will help you be sure that your skin is clean before surgery. Instructions on cleaning your skin before surgery:      The night before your surgery:  You will need to shower with warm water (not hot) and the CHG soap.  Use a clean wash cloth and a clean towel. Have clean clothes available to put on after the shower.   First wash your hair with regular shampoo. Rinse your hair and body thoroughly to remove the shampoo.  Wash your face and genital area (private parts) with your regular soap or water only. Thoroughly rinse your body with warm water from the neck down.  Turn water off to prevent rinsing the soap off too soon.  With a clean wet washcloth and half of the CHG soap in the bottle, lather your entire body from the neck down. Do not use CHG soap near your eyes or ears to avoid injury to those areas.  Wash thoroughly, paying special attention to the area where your surgery will be performed.  Wash your body gently for five (5) minutes. Avoid scrubbing your skin too hard.  Turn the water back on and rinse your body thoroughly.  Pat yourself dry with a clean, soft towel. Do not apply lotion, cream or powder.  Dress with clean freshly washed clothes. The morning of surgery:     Repeat shower following steps above - using remaining half of CHG soap in bottle. Patient Instructions:    Corey Riddles If you are having any type of anesthesia you are to have nothing to eat or drink after midnight the night before your surgery. This includes gum, hard candy, mints, water or smoking or chewing tobacco.  The only exception to this is a small sip of water to take with any morning dose of heart, blood pressure, or seizure medications. No alcoholic beverages for 24 hours prior to surgery.  Brush your teeth but do not swallow water.  Bring your eye glasses and case with you. No contacts are to be worn the day of surgery. You also may bring your hearing aids. Most surgical procedures involving anesthesia will require that you remove your dentures prior to surgery.     · Do not wear any jewelry or body piercings day of surgery. Also, NO lotion, perfume or deodorant to be used the day of surgery. No nail polish on the operative extremity (arm/leg surgeries)    · If you are staying overnight with us, please bring a small bag of necessary personal items.  Please wear loose, comfortable clothing. If you are potentially going to have a cast or brace bring clothing that will fit over them.  In case of illness - If you have cold or flu like symptoms (high fever, runny nose, sore throat, cough, etc.) rash, nausea, vomiting, loose stools, and/or recent contact with someone who has a contagious disease (chicken pox, measles, etc.) Please call your doctor before coming to the hospital.         Day of Surgery/Procedure:    As a patient at [facility] you can expect quality medical and nursing care that is centered on your individual needs. Our goal is to make your surgical experience as comfortable as possible    . Transportation After Your Surgery/Procedure: You will need a friend or family member to drive you home after your procedure. Your  must be 25years of age or older and able to sign off on your discharge instructions. A taxi cab or any other form of public transportation is not acceptable. Your friend or family member must stay at the hospital throughout your procedure. Someone must remain with you for the first 24 hours after your surgery if you receive anesthesia or medication. If you do not have someone to stay with you, your procedure may be cancelled.       If you have any other questions regarding your procedure or the day of surgery, please call [phone]      _________________________  ____________________________  Signature (Patient)              Signature (Provider)               Date
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
## 2022-02-23 LAB
EKG ATRIAL RATE: 86 BPM
EKG P AXIS: 59 DEGREES
EKG P-R INTERVAL: 146 MS
EKG Q-T INTERVAL: 346 MS
EKG QRS DURATION: 80 MS
EKG QTC CALCULATION (BAZETT): 414 MS
EKG R AXIS: 60 DEGREES
EKG T AXIS: 54 DEGREES
EKG VENTRICULAR RATE: 86 BPM

## 2022-02-23 PROCEDURE — 93010 ELECTROCARDIOGRAM REPORT: CPT | Performed by: INTERNAL MEDICINE

## 2022-03-04 DIAGNOSIS — S83.249A ACUTE MEDIAL MENISCAL TEAR, INITIAL ENCOUNTER: Primary | ICD-10-CM

## 2022-03-11 ENCOUNTER — HOSPITAL ENCOUNTER (OUTPATIENT)
Dept: LAB | Age: 47
Setting detail: SPECIMEN
Discharge: HOME | End: 2022-03-11
Payer: MEDICARE

## 2022-03-11 DIAGNOSIS — Z01.818 PREOP TESTING: Primary | ICD-10-CM

## 2022-03-11 PROCEDURE — U0003 INFECTIOUS AGENT DETECTION BY NUCLEIC ACID (DNA OR RNA); SEVERE ACUTE RESPIRATORY SYNDROME CORONAVIRUS 2 (SARS-COV-2) (CORONAVIRUS DISEASE [COVID-19]), AMPLIFIED PROBE TECHNIQUE, MAKING USE OF HIGH THROUGHPUT TECHNOLOGIES AS DESCRIBED BY CMS-2020-01-R: HCPCS

## 2022-03-11 PROCEDURE — U0005 INFEC AGEN DETEC AMPLI PROBE: HCPCS

## 2022-03-12 LAB
SARS-COV-2: NORMAL
SARS-COV-2: NOT DETECTED
SOURCE: NORMAL

## 2022-03-15 ENCOUNTER — ANESTHESIA EVENT (OUTPATIENT)
Dept: OPERATING ROOM | Age: 47
End: 2022-03-15
Payer: MEDICARE

## 2022-03-15 ENCOUNTER — ANESTHESIA (OUTPATIENT)
Dept: OPERATING ROOM | Age: 47
End: 2022-03-15
Payer: MEDICARE

## 2022-03-15 ENCOUNTER — HOSPITAL ENCOUNTER (OUTPATIENT)
Age: 47
Setting detail: OUTPATIENT SURGERY
Discharge: HOME | End: 2022-03-15
Attending: ORTHOPAEDIC SURGERY | Admitting: ORTHOPAEDIC SURGERY
Payer: MEDICARE

## 2022-03-15 VITALS
DIASTOLIC BLOOD PRESSURE: 79 MMHG | SYSTOLIC BLOOD PRESSURE: 125 MMHG | OXYGEN SATURATION: 98 % | HEIGHT: 68 IN | RESPIRATION RATE: 14 BRPM | BODY MASS INDEX: 31.52 KG/M2 | TEMPERATURE: 97.5 F | HEART RATE: 78 BPM | WEIGHT: 208 LBS

## 2022-03-15 VITALS — DIASTOLIC BLOOD PRESSURE: 55 MMHG | TEMPERATURE: 90.7 F | SYSTOLIC BLOOD PRESSURE: 110 MMHG | OXYGEN SATURATION: 96 %

## 2022-03-15 DIAGNOSIS — G89.18 POST-OP PAIN: Primary | ICD-10-CM

## 2022-03-15 PROCEDURE — 3700000001 HC ADD 15 MINUTES (ANESTHESIA): Performed by: ORTHOPAEDIC SURGERY

## 2022-03-15 PROCEDURE — 3600000013 HC SURGERY LEVEL 3 ADDTL 15MIN: Performed by: ORTHOPAEDIC SURGERY

## 2022-03-15 PROCEDURE — 3700000000 HC ANESTHESIA ATTENDED CARE: Performed by: ORTHOPAEDIC SURGERY

## 2022-03-15 PROCEDURE — 29881 ARTHRS KNE SRG MNISECTMY M/L: CPT | Performed by: ORTHOPAEDIC SURGERY

## 2022-03-15 PROCEDURE — 7100000000 HC PACU RECOVERY - FIRST 15 MIN: Performed by: ORTHOPAEDIC SURGERY

## 2022-03-15 PROCEDURE — 2580000003 HC RX 258: Performed by: ANESTHESIOLOGY

## 2022-03-15 PROCEDURE — 6360000002 HC RX W HCPCS: Performed by: ORTHOPAEDIC SURGERY

## 2022-03-15 PROCEDURE — 6360000002 HC RX W HCPCS: Performed by: NURSE ANESTHETIST, CERTIFIED REGISTERED

## 2022-03-15 PROCEDURE — 2580000003 HC RX 258: Performed by: ORTHOPAEDIC SURGERY

## 2022-03-15 PROCEDURE — 6360000002 HC RX W HCPCS: Performed by: ANESTHESIOLOGY

## 2022-03-15 PROCEDURE — 7100000001 HC PACU RECOVERY - ADDTL 15 MIN: Performed by: ORTHOPAEDIC SURGERY

## 2022-03-15 PROCEDURE — 2500000003 HC RX 250 WO HCPCS: Performed by: NURSE ANESTHETIST, CERTIFIED REGISTERED

## 2022-03-15 PROCEDURE — 2500000003 HC RX 250 WO HCPCS: Performed by: ORTHOPAEDIC SURGERY

## 2022-03-15 PROCEDURE — 2709999900 HC NON-CHARGEABLE SUPPLY: Performed by: ORTHOPAEDIC SURGERY

## 2022-03-15 PROCEDURE — 7100000011 HC PHASE II RECOVERY - ADDTL 15 MIN: Performed by: ORTHOPAEDIC SURGERY

## 2022-03-15 PROCEDURE — 3600000003 HC SURGERY LEVEL 3 BASE: Performed by: ORTHOPAEDIC SURGERY

## 2022-03-15 PROCEDURE — 7100000010 HC PHASE II RECOVERY - FIRST 15 MIN: Performed by: ORTHOPAEDIC SURGERY

## 2022-03-15 PROCEDURE — 6370000000 HC RX 637 (ALT 250 FOR IP): Performed by: ANESTHESIOLOGY

## 2022-03-15 RX ORDER — SODIUM CHLORIDE, SODIUM LACTATE, POTASSIUM CHLORIDE, CALCIUM CHLORIDE 600; 310; 30; 20 MG/100ML; MG/100ML; MG/100ML; MG/100ML
INJECTION, SOLUTION INTRAVENOUS CONTINUOUS
Status: DISCONTINUED | OUTPATIENT
Start: 2022-03-15 | End: 2022-03-15 | Stop reason: HOSPADM

## 2022-03-15 RX ORDER — MAGNESIUM HYDROXIDE 1200 MG/15ML
LIQUID ORAL CONTINUOUS PRN
Status: COMPLETED | OUTPATIENT
Start: 2022-03-15 | End: 2022-03-15

## 2022-03-15 RX ORDER — ONDANSETRON 2 MG/ML
4 INJECTION INTRAMUSCULAR; INTRAVENOUS
Status: DISCONTINUED | OUTPATIENT
Start: 2022-03-15 | End: 2022-03-15 | Stop reason: HOSPADM

## 2022-03-15 RX ORDER — SODIUM CHLORIDE 9 MG/ML
INJECTION, SOLUTION INTRAVENOUS CONTINUOUS
Status: DISCONTINUED | OUTPATIENT
Start: 2022-03-15 | End: 2022-03-15

## 2022-03-15 RX ORDER — LIDOCAINE HYDROCHLORIDE 10 MG/ML
1 INJECTION, SOLUTION EPIDURAL; INFILTRATION; INTRACAUDAL; PERINEURAL
Status: DISCONTINUED | OUTPATIENT
Start: 2022-03-15 | End: 2022-03-15 | Stop reason: HOSPADM

## 2022-03-15 RX ORDER — LIDOCAINE HYDROCHLORIDE 20 MG/ML
INJECTION, SOLUTION EPIDURAL; INFILTRATION; INTRACAUDAL; PERINEURAL PRN
Status: DISCONTINUED | OUTPATIENT
Start: 2022-03-15 | End: 2022-03-15 | Stop reason: SDUPTHER

## 2022-03-15 RX ORDER — FENTANYL CITRATE 50 UG/ML
INJECTION, SOLUTION INTRAMUSCULAR; INTRAVENOUS PRN
Status: DISCONTINUED | OUTPATIENT
Start: 2022-03-15 | End: 2022-03-15 | Stop reason: SDUPTHER

## 2022-03-15 RX ORDER — SODIUM CHLORIDE 0.9 % (FLUSH) 0.9 %
10 SYRINGE (ML) INJECTION EVERY 12 HOURS SCHEDULED
Status: DISCONTINUED | OUTPATIENT
Start: 2022-03-15 | End: 2022-03-15 | Stop reason: HOSPADM

## 2022-03-15 RX ORDER — DEXAMETHASONE SODIUM PHOSPHATE 10 MG/ML
INJECTION INTRAMUSCULAR; INTRAVENOUS PRN
Status: DISCONTINUED | OUTPATIENT
Start: 2022-03-15 | End: 2022-03-15 | Stop reason: SDUPTHER

## 2022-03-15 RX ORDER — BUPIVACAINE HYDROCHLORIDE 2.5 MG/ML
INJECTION, SOLUTION EPIDURAL; INFILTRATION; INTRACAUDAL
Status: DISCONTINUED
Start: 2022-03-15 | End: 2022-03-15 | Stop reason: HOSPADM

## 2022-03-15 RX ORDER — FENTANYL CITRATE 50 UG/ML
25 INJECTION, SOLUTION INTRAMUSCULAR; INTRAVENOUS EVERY 5 MIN PRN
Status: DISCONTINUED | OUTPATIENT
Start: 2022-03-15 | End: 2022-03-15 | Stop reason: HOSPADM

## 2022-03-15 RX ORDER — ONDANSETRON 2 MG/ML
INJECTION INTRAMUSCULAR; INTRAVENOUS PRN
Status: DISCONTINUED | OUTPATIENT
Start: 2022-03-15 | End: 2022-03-15 | Stop reason: SDUPTHER

## 2022-03-15 RX ORDER — MIDAZOLAM HYDROCHLORIDE 1 MG/ML
INJECTION INTRAMUSCULAR; INTRAVENOUS PRN
Status: DISCONTINUED | OUTPATIENT
Start: 2022-03-15 | End: 2022-03-15 | Stop reason: SDUPTHER

## 2022-03-15 RX ORDER — SODIUM CHLORIDE 0.9 % (FLUSH) 0.9 %
5-40 SYRINGE (ML) INJECTION PRN
Status: DISCONTINUED | OUTPATIENT
Start: 2022-03-15 | End: 2022-03-15 | Stop reason: HOSPADM

## 2022-03-15 RX ORDER — BUPIVACAINE HYDROCHLORIDE 2.5 MG/ML
INJECTION, SOLUTION INFILTRATION; PERINEURAL PRN
Status: DISCONTINUED | OUTPATIENT
Start: 2022-03-15 | End: 2022-03-15 | Stop reason: ALTCHOICE

## 2022-03-15 RX ORDER — OXYCODONE HYDROCHLORIDE AND ACETAMINOPHEN 5; 325 MG/1; MG/1
1 TABLET ORAL EVERY 6 HOURS PRN
Qty: 28 TABLET | Refills: 0 | Status: SHIPPED | OUTPATIENT
Start: 2022-03-15 | End: 2022-03-22

## 2022-03-15 RX ORDER — PROPOFOL 10 MG/ML
INJECTION, EMULSION INTRAVENOUS PRN
Status: DISCONTINUED | OUTPATIENT
Start: 2022-03-15 | End: 2022-03-15 | Stop reason: SDUPTHER

## 2022-03-15 RX ORDER — SODIUM CHLORIDE 0.9 % (FLUSH) 0.9 %
5-40 SYRINGE (ML) INJECTION EVERY 12 HOURS SCHEDULED
Status: DISCONTINUED | OUTPATIENT
Start: 2022-03-15 | End: 2022-03-15 | Stop reason: HOSPADM

## 2022-03-15 RX ORDER — HYDROMORPHONE HYDROCHLORIDE 1 MG/ML
0.5 INJECTION, SOLUTION INTRAMUSCULAR; INTRAVENOUS; SUBCUTANEOUS EVERY 5 MIN PRN
Status: DISCONTINUED | OUTPATIENT
Start: 2022-03-15 | End: 2022-03-15 | Stop reason: HOSPADM

## 2022-03-15 RX ORDER — SODIUM CHLORIDE 0.9 % (FLUSH) 0.9 %
10 SYRINGE (ML) INJECTION PRN
Status: DISCONTINUED | OUTPATIENT
Start: 2022-03-15 | End: 2022-03-15 | Stop reason: HOSPADM

## 2022-03-15 RX ORDER — OXYCODONE HYDROCHLORIDE AND ACETAMINOPHEN 5; 325 MG/1; MG/1
1 TABLET ORAL ONCE
Status: COMPLETED | OUTPATIENT
Start: 2022-03-15 | End: 2022-03-15

## 2022-03-15 RX ORDER — SODIUM CHLORIDE 9 MG/ML
25 INJECTION, SOLUTION INTRAVENOUS PRN
Status: DISCONTINUED | OUTPATIENT
Start: 2022-03-15 | End: 2022-03-15 | Stop reason: HOSPADM

## 2022-03-15 RX ADMIN — HYDROMORPHONE HYDROCHLORIDE 0.5 MG: 1 INJECTION, SOLUTION INTRAMUSCULAR; INTRAVENOUS; SUBCUTANEOUS at 14:30

## 2022-03-15 RX ADMIN — Medication 50 MCG: at 13:43

## 2022-03-15 RX ADMIN — LIDOCAINE HYDROCHLORIDE 100 MG: 20 INJECTION, SOLUTION EPIDURAL; INFILTRATION; INTRACAUDAL; PERINEURAL at 12:50

## 2022-03-15 RX ADMIN — Medication 50 MCG: at 13:13

## 2022-03-15 RX ADMIN — CEFAZOLIN 2000 MG: 10 INJECTION, POWDER, FOR SOLUTION INTRAVENOUS at 13:00

## 2022-03-15 RX ADMIN — Medication 50 MCG: at 12:50

## 2022-03-15 RX ADMIN — PROPOFOL 200 MG: 10 INJECTION, EMULSION INTRAVENOUS at 12:50

## 2022-03-15 RX ADMIN — OXYCODONE AND ACETAMINOPHEN 1 TABLET: 5; 325 TABLET ORAL at 14:44

## 2022-03-15 RX ADMIN — Medication 50 MCG: at 13:15

## 2022-03-15 RX ADMIN — SODIUM CHLORIDE, POTASSIUM CHLORIDE, SODIUM LACTATE AND CALCIUM CHLORIDE: 600; 310; 30; 20 INJECTION, SOLUTION INTRAVENOUS at 10:49

## 2022-03-15 RX ADMIN — DEXAMETHASONE SODIUM PHOSPHATE 10 MG: 10 INJECTION INTRAMUSCULAR; INTRAVENOUS at 13:00

## 2022-03-15 RX ADMIN — MIDAZOLAM 2 MG: 1 INJECTION INTRAMUSCULAR; INTRAVENOUS at 12:45

## 2022-03-15 RX ADMIN — ONDANSETRON 4 MG: 2 INJECTION INTRAMUSCULAR; INTRAVENOUS at 13:34

## 2022-03-15 ASSESSMENT — PULMONARY FUNCTION TESTS
PIF_VALUE: 1
PIF_VALUE: 13
PIF_VALUE: 8
PIF_VALUE: 3
PIF_VALUE: 3
PIF_VALUE: 11
PIF_VALUE: 9
PIF_VALUE: 13
PIF_VALUE: 9
PIF_VALUE: 10
PIF_VALUE: 13
PIF_VALUE: 1
PIF_VALUE: 9
PIF_VALUE: 9
PIF_VALUE: 1
PIF_VALUE: 10
PIF_VALUE: 13
PIF_VALUE: 10
PIF_VALUE: 10
PIF_VALUE: 13
PIF_VALUE: 10
PIF_VALUE: 13
PIF_VALUE: 8
PIF_VALUE: 18
PIF_VALUE: 10
PIF_VALUE: 4
PIF_VALUE: 11
PIF_VALUE: 9
PIF_VALUE: 13
PIF_VALUE: 9
PIF_VALUE: 2
PIF_VALUE: 12
PIF_VALUE: 13
PIF_VALUE: 8
PIF_VALUE: 21
PIF_VALUE: 1
PIF_VALUE: 5
PIF_VALUE: 4
PIF_VALUE: 8
PIF_VALUE: 5
PIF_VALUE: 9
PIF_VALUE: 4
PIF_VALUE: 8
PIF_VALUE: 8
PIF_VALUE: 3
PIF_VALUE: 10
PIF_VALUE: 1
PIF_VALUE: 13
PIF_VALUE: 10
PIF_VALUE: 8
PIF_VALUE: 9
PIF_VALUE: 11
PIF_VALUE: 10
PIF_VALUE: 3
PIF_VALUE: 8
PIF_VALUE: 10
PIF_VALUE: 10
PIF_VALUE: 13
PIF_VALUE: 10
PIF_VALUE: 10
PIF_VALUE: 1
PIF_VALUE: 9

## 2022-03-15 ASSESSMENT — PAIN DESCRIPTION - LOCATION
LOCATION: KNEE
LOCATION: KNEE

## 2022-03-15 ASSESSMENT — PAIN - FUNCTIONAL ASSESSMENT: PAIN_FUNCTIONAL_ASSESSMENT: 0-10

## 2022-03-15 ASSESSMENT — PAIN DESCRIPTION - PAIN TYPE
TYPE: SURGICAL PAIN
TYPE: SURGICAL PAIN

## 2022-03-15 ASSESSMENT — PAIN DESCRIPTION - DESCRIPTORS
DESCRIPTORS: ACHING;THROBBING
DESCRIPTORS: ACHING;THROBBING
DESCRIPTORS: SHARP

## 2022-03-15 ASSESSMENT — PAIN SCALES - GENERAL
PAINLEVEL_OUTOF10: 7
PAINLEVEL_OUTOF10: 8
PAINLEVEL_OUTOF10: 0

## 2022-03-15 NOTE — BRIEF OP NOTE
Brief Postoperative Note      Patient: Francisco Schmitz  YOB: 1975  MRN: [id number]    Date of Procedure: 3/15/2022    Pre-Op Diagnosis: DX RIGHT KNEE MEDIAL MENISCUS TEAR    Post-Op Diagnosis:   1. RIGHT KNEE MEDIAL MENISCUS TEAR. 2. RIGHT KNEE [id number] Grade 3 chondral lesion   3.  RIGHT MEDIAL PATELLAR FACET GRADE 3 CHONDROMALACIA        Procedure(s):  RIGHT KNEE ARTHROSCOPY  PARTIAL MEDIAL MENISCECTOMY    Surgeon(s):  Toro Flynn DO    Assistant:  Resident: Chaka Mcdonald DO    Anesthesia: General    Estimated Blood Loss (mL): 5CC    Complications: None    Specimens:   * No specimens in log *    Implants:  * No implants in log *      Drains: * No LDAs found *    Findings: RIGHT KNEE MEDIAL MENISCUS TEAR, CHONDROMALACIA MEDIAL FEMORAL CONDYLE AND PATELLA     Electronically signed by Chaka Mcdonald DO on 3/15/2022 at 1:51 PM

## 2022-03-15 NOTE — ANESTHESIA POSTPROCEDURE EVALUATION
Department of Anesthesiology  Postprocedure Note    Patient: Durga Mathew  MRN: [id number]  [place]: 1975  Date of evaluation: 3/15/2022  Time:  4:08 PM     Procedure Summary     Date: 03/15/22 Room / Location: [address]    Anesthesia Start: 6538 Anesthesia Stop: 5199    Procedure: RIGHT KNEE ARTHROSCOPY  PARTIAL MEDIAL MENISCECTOMY (Right Knee) Diagnosis: (DX RIGHT KNEE MEDIAL MENISCUS TEAR)    Surgeons: Harish Dye DO Responsible Provider: Ag Blake DO    Anesthesia Type: general ASA Status: 2          Anesthesia Type: general    Jorge L Phase I:      Jorge L Phase II:      Last vitals: Reviewed and per EMR flowsheets.        Anesthesia Post Evaluation    Patient location during evaluation: PACU  Patient participation: complete - patient participated  Level of consciousness: awake and alert  Airway patency: patent  Nausea & Vomiting: no nausea and no vomiting  Complications: no  Cardiovascular status: hemodynamically stable  Respiratory status: acceptable  Hydration status: stable
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
## 2022-03-16 ENCOUNTER — TELEPHONE (OUTPATIENT)
Dept: ORTHOPEDIC SURGERY | Age: 47
End: 2022-03-16

## 2022-03-16 NOTE — TELEPHONE ENCOUNTER
Patient had a RIGHT KNEE ARTHROSCOPY  PARTIAL MEDIAL MENISCECTOMY yesterday with you and . Patients says since his apartment is 3 stories up and he has to do therapy that he will require percocet 7.5mg rather than the 5mg. Patient says he still has the 5mg Rx in hand if we can prescribe the higher dose. Please advise.

## 2022-03-16 NOTE — OP NOTE
Operative Note      Patient: Xiomara Wong  YOB: 1975  MRN: [id number]    Date of Procedure: 3/15/2022    Pre-Op Diagnosis: RIGHT KNEE MEDIAL MENISCUS TEAR    Post-Op Diagnosis:   1. RIGHT KNEE MEDIAL MENISCUS TEAR   2. RIGHT KNEE 03w14at GRADE III CHONDRAL LESION   3. RIGHT MEDIAL PATELLA FACET CHONDRAL LESION GRADE III       Procedure(s):  RIGHT KNEE ARTHROSCOPY  PARTIAL MEDIAL MENISCECTOMY    Surgeon(s):  Tena Valentin DO    Assistant:   Resident: Ashwini Jimenez DO    Anesthesia: General    Estimated Blood Loss (mL): 5    Complications: None    Specimens:  None    Implants: None      Drains: None    Detailed Description of Procedure: The patient is a 48 yo male with right knee pain. He has MRI which demonstrates a medial mensicus tear as well as degenerative changes to the articular cartilage. He has attempted conservative treatment with limited relief and has elected to proceed forward with right knee arthroscopy. Risks and benefits of the procedure were discussed in detail with the patient. No guarantees were made. Informed consent was obtained in office. The patient was seen in the preoperative holding area. The patient's H&P was reviewed, questions were answered, and the correct operative site was identified and consent was obtained. Pt was then transported to the operating room by Department of Anesthesia, transferred to the operating table and safely secured in place. Prior to beginning, a surgical timeout was performed and everyone in attendance was in agreeance. Pt was placed in a supine position and the Department of Anesthesia provided General without difficulty. Pt was given 2g of intravenous Ancef for prophylactic antibiotic coverage. After proper patient positioning. The Right leg was then placed in an arthroscopic knee benitez.  The foot of the bed was then lowered carefully and the non-operative leg was well padded and secured to the table, making sure that the hip was not in extension. The Right leg was then prepped and draped in the usual sterile manner for this case. A anterolarteral arthroscopy portal incision was made into the  Right knee using an 11-blade. The introducing trocar was then used to enter the knee joint. The trocar was initially inserted aiming toward the intercondylar notch. After the joint capsule was penetrated the trocar was advanced superiorly into the suprapatellar pouch. The inner trocar was removed and the 30 degree arthrocsope was inserted through the trocar into the knee. The knee was insufflated to a monitored 60 mm Hg with normal saline. The suprapatellar pouch was then visualized. The suprapatellar plica/clayton synovallis was not present. There was no synovitis, loose bodies, scarring     The patellofemoral joint was then visualized. The patella was first visualized, there was a grade 3 chondral lesion on the medial patella facet. The trochlea was then examined and found to be in good condition. The patella tracking was then examined with passively ranging the knee from extension into flexion and noting the articulation. The tracking appeared to be appropriate. The lateral gutter was then inspected making sure to visualize the popliteal hiatus and popliteus, also making note for any osteophytes, loose bodies, synovitis. The medial gutter was then inspected which demonstrated no abnormalities. The medial tibiofemoral compartment was then visualized with the knee in 20-30 degrees of flexion with a valgus stress applied to the knee in order to open the compartment. The medial tibial plateau, medial femur and medial meniscus were all evaluated. A probe was used to completely evaluate the compartment. There was a stable 06c20kg chondral lesion to the [address].  There was also a unstable vertical tear at the posterior horn junction of the medial meniscus     The intercondylar notch was then investigated, evaluating the ACL, PCL, intermeniscal ligament, medial and lateral tibial eminences, anterior insertion of the medial and lateral meniscus were alberto. .     The lateral tibiofemoral compartment was then inspected by placing the knee in a modified figure-4 position opening the lateral compartment the varus stress on the knee. The lateral tibial plateau, lateral femur and lateral meniscus were all evaluated. A probe was used to completely evaluate the compartment. The meniscus was in good condition. The lateral compartment was in good condition. We then proceeded to insert an arthroscopic biter and trimmed back the unstable posteromedial meniscus tear to a stable rim. The shaver was used to make a smooth transition. We lightly debrided the articular cartilage of the medial femoral condyle. After completion of the above procedure the knee was irrigated one last time an then completely drained of fluid prior to removal of the arthroscope. A 18G needle was then used to inject 30cc of 0.25% Marcaine  into the knee joint and around the incision sites. A 3-0 Nylon suture was then used to close the arthroscopy portals,  good approximation the skin was achieved. The skin was cleansed and dried. adaptic was placed over the incision followed by sterile 4 x 4s and then wrapped in sterile webril. A ace-wrap was used to wrap the leg from the ankle to the thigh. The drapes were removed from the patient. The Department of Anesthesia awoke patient without difficulty. The patient was transferred supine in the operative gurney and was transported to the postanesthesia care unit in stable condition.       Electronically signed by Pillo Pike DO on 3/16/2022 at 12:52 PM

## 2022-03-29 ENCOUNTER — OFFICE VISIT (OUTPATIENT)
Dept: ORTHOPEDIC SURGERY | Age: 47
End: 2022-03-29

## 2022-03-29 VITALS
DIASTOLIC BLOOD PRESSURE: 78 MMHG | HEART RATE: 97 BPM | BODY MASS INDEX: 31.83 KG/M2 | HEIGHT: 68 IN | SYSTOLIC BLOOD PRESSURE: 121 MMHG | RESPIRATION RATE: 12 BRPM | WEIGHT: 210 LBS

## 2022-03-29 DIAGNOSIS — Z98.890 S/P ARTHROSCOPIC SURGERY OF RIGHT KNEE: Primary | ICD-10-CM

## 2022-03-29 PROCEDURE — 99024 POSTOP FOLLOW-UP VISIT: CPT | Performed by: PHYSICIAN ASSISTANT

## 2022-03-29 RX ORDER — OXYCODONE AND ACETAMINOPHEN 7.5; 325 MG/1; MG/1
1 TABLET ORAL EVERY 4 HOURS PRN
COMMUNITY

## 2022-03-29 ASSESSMENT — ENCOUNTER SYMPTOMS
RESPIRATORY NEGATIVE: 1
COLOR CHANGE: 0
APNEA: 0
ABDOMINAL PAIN: 0
CONSTIPATION: 0
NAUSEA: 0
VOMITING: 0
SHORTNESS OF BREATH: 0
COUGH: 0
DIARRHEA: 0
CHEST TIGHTNESS: 0
ABDOMINAL DISTENTION: 0

## 2022-03-29 NOTE — PROGRESS NOTES
[address]  [facility]  [address]  [address] [facility] [address]  Dept: [phone]  Dept Fax: [phone]        Post Operative Follow Up    Subjective:     Chief Complaint   Patient presents with    Post-Op Check     R knee scope partial medial meniscectomy dos 3/15/22     Post Op Surgery:     The patient is here for a follow up after having a right knee arthroscopy with partial medial meniscectomy. The date of surgery was 3/15/2022. Therefore he is 2 weeks postop. He is currently on Percocet 7.5/325 from his PCP. He states he did not fill the prescription for the 5 mg Percocet. Patient states they are feeling better after surgery. He still has some painful popping in his knee but overall it feels better. He is going to physical therapy 2 times a week. He is also taking ibuprofen. He did not take any aspirin after surgery. Review of Systems   Constitutional: Positive for activity change. Negative for appetite change, fatigue and fever. Respiratory: Negative. Negative for apnea, cough, chest tightness and shortness of breath. Cardiovascular: Negative. Negative for chest pain, palpitations and leg swelling. Gastrointestinal: Negative for abdominal distention, abdominal pain, constipation, diarrhea, nausea and vomiting. Genitourinary: Negative for difficulty urinating, dysuria and hematuria. Musculoskeletal: Positive for arthralgias, gait problem and joint swelling. Negative for myalgias. Skin: Negative for color change and rash. Neurological: Negative for dizziness, weakness, numbness and headaches. Psychiatric/Behavioral: Negative for sleep disturbance. I have reviewed the CC, HPI, ROS, PMH, FHX, Social History, and if not present in this note, I have reviewed in the patient's chart.  I agree with the documentation provided by other staff and have reviewed their documentation prior to providing my signature indicating agreement. Vitals:   /78   Pulse 97   Resp 12   Ht 5' 8\" (1.727 m)   Wt 210 lb (95.3 kg)   BMI 31.93 kg/m²  Body mass index is 31.93 kg/m². Physical Examination:     Orthopedics:    GENERAL: Alert and oriented X3 in no acute distress. SKIN: Intact without lesions or ulcerations. Incision is healing well with no signs of infection. NEURO: Intact to sensory and motor testing. VASC: Capillary refill is less than 3 seconds. Post Op Exam:    LOCATION: Right knee  SITE: Distal neurocirculatory status is intact. EXAM: Sensation is intact to light touch, there is full motor function of the extremity. ROM: 5/95 degrees     Radiology:   No results found. Assessment:     1. S/P arthroscopic surgery of right knee      Plan:   Post Op Treatment : Patient had the treatment regimen reviewed today 2 weeks status post right knee arthroscopy with partial medial meniscectomy. I reviewed the films with the patient. During today's visit, we discussed that he should not continue needing narcotic pain medicine for his knee more than a couple weeks after surgery. He has done a great job because there is really no effusion of his knee. His motion is coming back nicely. He should continue with physical therapy for range of motion and strengthening. The patient then stated that they understand the plan and at this time, the patient has opted continuing physical therapy and washing his incision daily with soap and water. Patient should return to the office in 4 weeks to f/u with Roderick Kelly DO. The patient will call the office immediately with any problems that may arise. No orders of the defined types were placed in this encounter. No orders of the defined types were placed in this encounter.         Electronically signed by Nadine Austin PA-C, on 3/29/2022 at 2:44 PM

## 2022-07-08 ENCOUNTER — HOSPITAL ENCOUNTER (OUTPATIENT)
Dept: MRI IMAGING | Facility: CLINIC | Age: 47
Discharge: HOME | End: 2022-07-10

## 2022-07-08 DIAGNOSIS — M54.59 OTHER LOW BACK PAIN: ICD-10-CM

## 2023-03-16 DIAGNOSIS — M22.41 CHONDROMALACIA, PATELLA, RIGHT: Primary | ICD-10-CM

## 2023-03-16 NOTE — PROGRESS NOTES
[address] [place] [address] [facility]  [facility]  [address] [place] [address]  Dept: [phone]  Dept Fax: [phone]        Ambulatory Follow Up      Subjective:   Heriberto Ward is a 51y.o. year old male who presents to our office today for routine followup regarding his   1. Chondromalacia, patella, right    2. Acute medial meniscal tear, subsequent encounter    . Chief Complaint   Patient presents with    Knee Pain     R Knee Pain       Date of surgery: 3/15/2022 by Dr. Enrrique Parr, DO-right knee arthroscopy with partial medial meniscectomy    HPI Heriberto Ward  is a 51 y.o.  male who presents today in follow for right knee pain. The patient was last seen on 3/29/2022 and underwent treatment in the form of completing physical therapy. He sent a message in my chart the other day stating that his right knee pain is back. He was also having significant back pain. He states his family doctor did do a cortisone injection a few months ago but he cannot remember the date. He states it may have helped slightly but not much. He does have an appointment with pain management for injections into his lumbar spine on March 30. He presents today with no assisting devices. He states he is taking meloxicam.  No new injury. Review of Systems   Constitutional:  Positive for activity change. Negative for appetite change, fatigue and fever. Respiratory: Negative. Negative for apnea, cough, chest tightness and shortness of breath. Cardiovascular: Negative. Negative for chest pain, palpitations and leg swelling. Gastrointestinal:  Negative for abdominal distention, abdominal pain, constipation, diarrhea, nausea and vomiting. Genitourinary:  Negative for difficulty urinating, dysuria and hematuria. Musculoskeletal:  Positive for arthralgias and gait problem. Negative for joint swelling and myalgias.    Skin: Negative for color change and rash. Neurological:  Negative for dizziness, weakness, numbness and headaches. Psychiatric/Behavioral:  Positive for sleep disturbance. Objective :   Resp 14   Ht 5' 8\" (1.727 m)   Wt 211 lb (95.7 kg)   BMI 32.08 kg/m²  Body mass index is 32.08 kg/m². General: Jony Huynh is a 51 y.o. male who is alert and oriented and sitting comfortably in our office. Orthopedics:    GENERAL: Alert and oriented X3 in no acute distress. SKIN: Intact without lesions or ulcerations. NEURO: Musculoskeletal and axillary nerves intact to sensory and motor testing. VASC: Capillary refill is less than 3 seconds. Knee Exam    LOCATION:  Right Knee  GEN: Alert and oriented X 3, in no acute distress. GAIT: The patient's gait was observed while entering the exam room and was noted to be non antalgic. The extremity is in anatomic alignment. SKIN: Intact without rashes, lesions, or ulcerations. No obvious deformity or swelling. NEURO: The patient responds to light touch throughout bilateral LE. Patellar and Achilles reflexes are 2/4. VASC: The bilateral LE is neurovascularly intact with 2/4 DP and 2/4 PT pulses. Brisk capillary refill. ROM: 0/142 degrees. There is no effusion. MUSC: good quad tone  LIGAMENT: Lachman's test is Negative with Good endpoint. Anterior drawer Negative. Posterior drawer Negative. There is  No varus instability at 0 degrees and No varus instability at 30 degrees. There is No valgus instability at 0 degrees and No valgus instability at 30 degrees. SPECIAL: Brenda test is negative with no clunks, + crepitation, and no pain. Positive straight leg raise bilaterally, worse on the right  PALP: There is medial joint line pain.      Radiology:   XR KNEE RIGHT (MIN 4 VIEWS)    Result Date: 3/17/2023  KNEE X-RAY 4 views of the right knee including AP, tunnel, and lateral in the upright position, and skyline views reveal anatomic alignment with no fracture or dislocation. Kellgren grade 2 changes of osteoarthritis (joint space narrowing, osteophyte, subchondral sclerosis, bony deformity/cyst) of the tricompartment(s). No osseous loose bodies. No bony erosion or periosteal reaction. No soft tissue masses. Impression: Mild degenerative changes of the right knee. Procedure: None    Assessment:      1. Chondromalacia, patella, right    2. Acute medial meniscal tear, subsequent encounter       Plan:   We discussed the etiology of chondromalacia of the patella. We discussed the various treatment alternatives including anti-inflammatory medications, physical therapy, injections, further imaging studies and a last resort surgery. During today's visit he did not have pain in his knee on exam.  It seems to be radicular pain coming from his back because he did have a positive straight leg raise. I would encourage him to stay with his orthopedic spine doctor at [facility]. I explained bouncing from one doctor to another we will get lost.  I would follow-up with the injections from pain management. We do not have a spine specialist at this office but we do have Dr. Aida Jordan if he changes his mind and wants a different orthopedic spine doctor. As for his knee, if his knee gets worse we can try another injection to see how well it works. He states the last injection might help for 1 week. The patient will follow-up with me as needed. He will call if he has any questions or concerns. Follow up:Return if symptoms worsen or fail to improve. No orders of the defined types were placed in this encounter. No orders of the defined types were placed in this encounter. This note is created with the assistance of a speech recognition program.  While intending to generate a document that actually reflects the content of the visit, the document can still have some errors including those of syntax and sound a like substitutions which may escape proof reading.   In such instances, actual meaning can be extrapolated by contextual diversion.      Electronically signed by Palak Alvarez PA-C on 3/17/2023 at 8:34 AM

## 2023-03-17 ENCOUNTER — OFFICE VISIT (OUTPATIENT)
Dept: ORTHOPEDIC SURGERY | Age: 48
End: 2023-03-17
Payer: MEDICAID

## 2023-03-17 VITALS — WEIGHT: 211 LBS | RESPIRATION RATE: 14 BRPM | HEIGHT: 68 IN | BODY MASS INDEX: 31.98 KG/M2

## 2023-03-17 DIAGNOSIS — M22.41 CHONDROMALACIA, PATELLA, RIGHT: Primary | ICD-10-CM

## 2023-03-17 DIAGNOSIS — S83.249A ACUTE MEDIAL MENISCAL TEAR, INITIAL ENCOUNTER: ICD-10-CM

## 2023-03-17 PROCEDURE — 99213 OFFICE O/P EST LOW 20 MIN: CPT | Performed by: PHYSICIAN ASSISTANT

## 2023-03-17 ASSESSMENT — ENCOUNTER SYMPTOMS
CHEST TIGHTNESS: 0
SHORTNESS OF BREATH: 0
NAUSEA: 0
APNEA: 0
ABDOMINAL PAIN: 0
DIARRHEA: 0
CONSTIPATION: 0
COUGH: 0
RESPIRATORY NEGATIVE: 1
ABDOMINAL DISTENTION: 0
VOMITING: 0
COLOR CHANGE: 0

## 2023-05-15 ENCOUNTER — INITIAL CONSULT (OUTPATIENT)
Dept: PAIN MANAGEMENT | Age: 48
End: 2023-05-15
Payer: MEDICAID

## 2023-05-15 VITALS — BODY MASS INDEX: 32.1 KG/M2 | HEIGHT: 68 IN | WEIGHT: 211.8 LBS

## 2023-05-15 DIAGNOSIS — M46.1 SACROILIITIS (HCC): Primary | ICD-10-CM

## 2023-05-15 DIAGNOSIS — M47.817 LUMBOSACRAL SPONDYLOSIS WITHOUT MYELOPATHY: ICD-10-CM

## 2023-05-15 PROCEDURE — 27096 INJECT SACROILIAC JOINT: CPT | Performed by: PAIN MEDICINE

## 2023-05-15 PROCEDURE — 99204 OFFICE O/P NEW MOD 45 MIN: CPT | Performed by: PAIN MEDICINE

## 2023-05-15 RX ORDER — DIAPER,BRIEF,INFANT-TODD,DISP
EACH MISCELLANEOUS
COMMUNITY

## 2023-05-15 RX ORDER — POLYETHYLENE GLYCOL 3350 17 G/17G
POWDER ORAL
COMMUNITY
Start: 2023-04-04

## 2023-05-15 RX ORDER — IBUPROFEN 800 MG/1
TABLET ORAL
COMMUNITY

## 2023-05-15 RX ORDER — SILDENAFIL 25 MG/1
25 TABLET, FILM COATED ORAL DAILY PRN
COMMUNITY
Start: 2023-03-30

## 2023-05-15 RX ORDER — LIDOCAINE 40 MG/G
CREAM TOPICAL
COMMUNITY
Start: 2023-02-13

## 2023-05-15 RX ORDER — HALOPERIDOL 5 MG/1
TABLET ORAL
COMMUNITY
Start: 2023-04-18

## 2023-05-15 RX ORDER — LIDOCAINE 4 G/G
PATCH TOPICAL
COMMUNITY

## 2023-05-15 ASSESSMENT — ENCOUNTER SYMPTOMS
BACK PAIN: 1
BOWEL INCONTINENCE: 0

## 2023-05-15 NOTE — PROGRESS NOTES
Other Topics Concern    Not on file   Social History Narrative    Not on file     Social Determinants of Health     Financial Resource Strain: Not on file   Food Insecurity: Not on file   Transportation Needs: Not on file   Physical Activity: Not on file   Stress: Not on file   Social Connections: Not on file   Intimate Partner Violence: Not on file   Housing Stability: Not on file       Review of Systems:  Review of Systems   Musculoskeletal:  Positive for back pain. Gastrointestinal:  Negative for bowel incontinence. Genitourinary:  Negative for bladder incontinence. Physical Exam:    Physical Exam  Constitutional:       Appearance: Normal appearance. Pulmonary:      Effort: Pulmonary effort is normal.   Neurological:      Mental Status: He is alert. Psychiatric:         Attention and Perception: Attention and perception normal.         Mood and Affect: Mood and affect normal.       Record/Diagnostics Review:    As above, I did review the imaging    Orders:  Orders Placed This Encounter   Procedures    SD INJECT SI JOINT ARTHRGRPHY&/ANES/STEROID W/LIV       Assessment:  1. Sacroiliitis ([facility] [address]    2. Lumbosacral spondylosis without myelopathy        Treatment Plan:  DISCUSSION: Treatment options discussed with patient and all questions answered to patient's satisfaction. Risks, benefits, and alternatives of treatment discussed with the patient. OARRS Review: Reviewed and acceptable for medications prescribed. TREATMENT OPTIONS:     Discussed different treatment options including continued conservative care such as physical therapy, chiropractic care, acupuncture. Discussed different interventional options such as epidural steroids or medial branch blocks. Also discussed surgical evaluation. Has surgical evaluation pending. Would consider injections.   Patient seemingly over the SI joints, may benefit from bilateral SI joint interval diagnostic therapeutic purposes    Consider lumbar MBB as

## (undated) DEVICE — [AGGRESSIVE PLUS CUTTER, ARTHROSCOPIC SHAVER BLADE,  DO NOT RESTERILIZE,  DO NOT USE IF PACKAGE IS DAMAGED,  KEEP DRY,  KEEP AWAY FROM SUNLIGHT]: Brand: FORMULA

## (undated) DEVICE — SOLUTION IRRIG 3000ML 0.9% SOD CHL USP UROMATIC PLAS CONT

## (undated) DEVICE — BANDAGE COMPR W6INXL10YD ST M E WHITE/BEIGE

## (undated) DEVICE — GLOVE SURG SZ 85 CRM LTX FREE POLYISOPRENE POLYMER BEAD ANTI

## (undated) DEVICE — Device

## (undated) DEVICE — DRESSING,GAUZE,XEROFORM,CURAD,5"X9",ST: Brand: CURAD

## (undated) DEVICE — GOWN,AURORA,NONRNF,XL,30/CS: Brand: MEDLINE

## (undated) DEVICE — DRAPE,U/ SHT,SPLIT,PLAS,STERIL: Brand: MEDLINE

## (undated) DEVICE — APPLICATOR MEDICATED 26 CC SOLUTION HI LT ORNG CHLORAPREP

## (undated) DEVICE — SUTURE NONABSORBABLE MONOFILAMENT 3-0 PS-1 18 IN BLK ETHILON 1663H

## (undated) DEVICE — 10K ARTHROSCOPY INFLOW/OUTFLOW TUBE SET: Brand: 10K